# Patient Record
Sex: FEMALE | Race: WHITE | NOT HISPANIC OR LATINO | Employment: UNEMPLOYED | RURAL
[De-identification: names, ages, dates, MRNs, and addresses within clinical notes are randomized per-mention and may not be internally consistent; named-entity substitution may affect disease eponyms.]

---

## 2022-04-08 ENCOUNTER — TELEPHONE (OUTPATIENT)
Dept: FAMILY MEDICINE CLINIC | Facility: CLINIC | Age: 37
End: 2022-04-08

## 2022-04-08 NOTE — TELEPHONE ENCOUNTER
Patient called, stated she had to lower dosage of gabapentin, stated she fell and hurt her tailbone and lower part of back. Would like to see if Anisa can call in the original dosage of Gabapentin. Please advise.

## 2022-04-22 ENCOUNTER — TELEPHONE (OUTPATIENT)
Dept: FAMILY MEDICINE CLINIC | Facility: CLINIC | Age: 37
End: 2022-04-22

## 2022-04-22 RX ORDER — ONDANSETRON HYDROCHLORIDE 8 MG/1
8 TABLET, FILM COATED ORAL EVERY 8 HOURS PRN
Qty: 30 TABLET | Refills: 0 | Status: SHIPPED | OUTPATIENT
Start: 2022-04-22 | End: 2023-02-01

## 2022-05-04 ENCOUNTER — PATIENT MESSAGE (OUTPATIENT)
Dept: FAMILY MEDICINE CLINIC | Facility: CLINIC | Age: 37
End: 2022-05-04

## 2022-05-09 ENCOUNTER — TELEPHONE (OUTPATIENT)
Dept: FAMILY MEDICINE CLINIC | Facility: CLINIC | Age: 37
End: 2022-05-09

## 2022-05-09 NOTE — TELEPHONE ENCOUNTER
Called pt to let know needs an appt or if this is getting worse needs to go back to the ER. I could not leave a voice mail because voice mail was full

## 2022-05-09 NOTE — TELEPHONE ENCOUNTER
PT CALLED TO REPORT SHE WAS SEEN AT [facility] 3 NIGHTS AGO, STATES THEY TESED URINE AND BLOOD WHILE SHE WAS THERE. STATES SHE HAS A KNOT ON BACK OF HEAD, HAVING CONSTANT MONET. RT ARM IS GOING NUMB AND PAINFUL, BOTH HANDS ARE TINGLING LIKE THEY ARE ASLEEP CONSTANTLY, [facility] INFORMED PT IT MAY BE CAUSED BY NERVE DAMAGE. PT WOULD LIKE TO KNOW WHAT IS ADVISE SHE DO NOW. PLEASE CONTACT PT [phone].

## 2022-05-10 NOTE — TELEPHONE ENCOUNTER
Patient called back and I read her Amber's msg.  She said she will probably go back to the hospital.

## 2022-05-25 ENCOUNTER — TELEPHONE (OUTPATIENT)
Dept: FAMILY MEDICINE CLINIC | Facility: CLINIC | Age: 37
End: 2022-05-25

## 2022-05-31 ENCOUNTER — TELEPHONE (OUTPATIENT)
Dept: FAMILY MEDICINE CLINIC | Facility: CLINIC | Age: 37
End: 2022-05-31

## 2022-06-14 ENCOUNTER — OFFICE VISIT (OUTPATIENT)
Dept: FAMILY MEDICINE CLINIC | Facility: CLINIC | Age: 37
End: 2022-06-14

## 2022-06-14 VITALS
WEIGHT: 170 LBS | HEART RATE: 68 BPM | SYSTOLIC BLOOD PRESSURE: 122 MMHG | BODY MASS INDEX: 30.12 KG/M2 | HEIGHT: 63 IN | OXYGEN SATURATION: 97 % | DIASTOLIC BLOOD PRESSURE: 70 MMHG

## 2022-06-14 DIAGNOSIS — M54.50 LUMBAR PAIN: ICD-10-CM

## 2022-06-14 DIAGNOSIS — E56.9 VITAMIN DEFICIENCY: ICD-10-CM

## 2022-06-14 DIAGNOSIS — G62.9 NEUROPATHY: Primary | ICD-10-CM

## 2022-06-14 DIAGNOSIS — F39 MOOD DISORDER: ICD-10-CM

## 2022-06-14 DIAGNOSIS — Z11.3 SCREENING FOR STD (SEXUALLY TRANSMITTED DISEASE): ICD-10-CM

## 2022-06-14 DIAGNOSIS — R73.9 HYPERGLYCEMIA: ICD-10-CM

## 2022-06-14 DIAGNOSIS — R53.83 FATIGUE, UNSPECIFIED TYPE: ICD-10-CM

## 2022-06-14 LAB
POC AMPHETAMINES: NEGATIVE
POC BARBITURATES: NEGATIVE
POC BENZODIAZEPHINES: NEGATIVE
POC COCAINE: NEGATIVE
POC METHADONE: NEGATIVE
POC METHAMPHETAMINE SCREEN URINE: NEGATIVE
POC OPIATES: NEGATIVE
POC OXYCODONE: NEGATIVE
POC PHENCYCLIDINE: NEGATIVE
POC PROPOXYPHENE: NEGATIVE
POC THC: NEGATIVE
POC TRICYCLIC ANTIDEPRESSANTS: NEGATIVE

## 2022-06-14 PROCEDURE — 80305 DRUG TEST PRSMV DIR OPT OBS: CPT | Performed by: NURSE PRACTITIONER

## 2022-06-14 PROCEDURE — 99213 OFFICE O/P EST LOW 20 MIN: CPT | Performed by: NURSE PRACTITIONER

## 2022-06-14 RX ORDER — GABAPENTIN 800 MG/1
800 TABLET ORAL 4 TIMES DAILY
Qty: 90 TABLET | Refills: 2 | Status: SHIPPED | OUTPATIENT
Start: 2022-06-14 | End: 2022-06-21 | Stop reason: SDUPTHER

## 2022-06-14 NOTE — PROGRESS NOTES
"Chief Complaint  Numbness    Subjective          Heidi Webb presents to [facility]  Pt is here to follow up on numbness, tingling, and pain in her extremities. She has been taking Gabapentin 4 times a day that helps this. She has also had increased mood instability for the past year.       Objective   Vital Signs:   /70   Pulse 68   Ht 160 cm (63\")   Wt 77.1 kg (170 lb)   SpO2 97%   BMI 30.11 kg/m²     Body mass index is 30.11 kg/m².    Review of Systems   Constitutional: Negative for fatigue and fever.   Respiratory: Negative for shortness of breath.    Cardiovascular: Negative for chest pain, palpitations and leg swelling.   Musculoskeletal: Positive for arthralgias, back pain and myalgias.   Neurological: Negative for syncope.   Psychiatric/Behavioral: The patient is not nervous/anxious.           Current Outpatient Medications:   •  gabapentin (Neurontin) 800 MG tablet, Take 1 tablet by mouth 4 (Four) Times a Day. Cancel the 600mg prescription please., Disp: 90 tablet, Rfl: 2  •  buprenorphine-naloxone (SUBOXONE) 8-2 MG per SL tablet, , Disp: , Rfl:   •  buPROPion XL (WELLBUTRIN XL) 300 MG 24 hr tablet, , Disp: , Rfl:   •  FLUoxetine (PROzac) 20 MG capsule, , Disp: , Rfl:   •  ibuprofen (ADVIL,MOTRIN) 800 MG tablet, , Disp: , Rfl:   •  Linzess 145 MCG capsule capsule, , Disp: , Rfl:   •  metoprolol tartrate (LOPRESSOR) 50 MG tablet, Take 50 mg by mouth 2 (Two) Times a Day., Disp: , Rfl:   •  ondansetron (ZOFRAN) 8 MG tablet, Take 1 tablet by mouth Every 8 (Eight) Hours As Needed for Nausea or Vomiting., Disp: 30 tablet, Rfl: 0  •  pantoprazole (PROTONIX) 40 MG EC tablet, Take 40 mg by mouth Daily., Disp: , Rfl:   •  valACYclovir (VALTREX) 1000 MG tablet, , Disp: , Rfl:       Allergies: Sulfa antibiotics and Ciprofloxacin    Physical Exam  Constitutional:       Appearance: Normal appearance.   HENT:      Head: Normocephalic.   Eyes:      Conjunctiva/sclera: " Conjunctivae normal.      Pupils: Pupils are equal, round, and reactive to light.   Cardiovascular:      Rate and Rhythm: Normal rate and regular rhythm.      Pulses: Normal pulses.      Heart sounds: Normal heart sounds.   Pulmonary:      Effort: Pulmonary effort is normal.      Breath sounds: Normal breath sounds.   Abdominal:      Tenderness: There is no abdominal tenderness.   Musculoskeletal:         General: Normal range of motion.      Right lower leg: No edema.      Left lower leg: No edema.      Comments: Paresthesia in bilat arms/legs   Skin:     General: Skin is warm and dry.      Capillary Refill: Capillary refill takes less than 2 seconds.   Neurological:      General: No focal deficit present.      Mental Status: She is alert and oriented to person, place, and time.   Psychiatric:         Mood and Affect: Mood normal.         Behavior: Behavior normal.         Thought Content: Thought content normal.         Judgment: Judgment normal.          Result Review :                   Assessment and Plan    Diagnoses and all orders for this visit:    1. Neuropathy (Primary)  Comments:  Increase Gabapentin dosage. Follow up with neurology. Return for worsened sx.   Orders:  -     Ambulatory Referral to Neurology  -     gabapentin (Neurontin) 800 MG tablet; Take 1 tablet by mouth 4 (Four) Times a Day. Cancel the 600mg prescription please.  Dispense: 90 tablet; Refill: 2  -     Hepatitis A antibody, total; Future  -     Hepatitis C antibody; Future  -     Hepatitis B surface antibody; Future  -     Hepatitis B core antibody, total; Future  -     Hepatitis B surface antigen; Future  -     Hepatitis A antibody, total  -     Hepatitis C antibody  -     Hepatitis B surface antibody  -     Hepatitis B core antibody, total  -     Hepatitis B surface antigen  -     POC Urine Drug Screen, Triage    2. Lumbar pain  -     gabapentin (Neurontin) 800 MG tablet; Take 1 tablet by mouth 4 (Four) Times a Day. Cancel the 600mg  prescription please.  Dispense: 90 tablet; Refill: 2  -     Hepatitis A antibody, total; Future  -     Hepatitis C antibody; Future  -     Hepatitis B surface antibody; Future  -     Hepatitis B core antibody, total; Future  -     Hepatitis B surface antigen; Future  -     Hepatitis A antibody, total  -     Hepatitis C antibody  -     Hepatitis B surface antibody  -     Hepatitis B core antibody, total  -     Hepatitis B surface antigen    3. Mood disorder (HCC)  Comments:  Follow up with psychiatry. Return for worsened sx.  Orders:  -     Ambulatory Referral to Psychiatry  -     Hepatitis A antibody, total; Future  -     Hepatitis C antibody; Future  -     Hepatitis B surface antibody; Future  -     Hepatitis B core antibody, total; Future  -     Hepatitis B surface antigen; Future  -     Hepatitis A antibody, total  -     Hepatitis C antibody  -     Hepatitis B surface antibody  -     Hepatitis B core antibody, total  -     Hepatitis B surface antigen  -     POC Urine Drug Screen, Triage    4. Vitamin deficiency  -     Vitamin B12; Future  -     Vitamin D 25 Hydroxy; Future  -     Folate; Future  -     Hepatitis A antibody, total; Future  -     Hepatitis C antibody; Future  -     Hepatitis B surface antibody; Future  -     Hepatitis B core antibody, total; Future  -     Hepatitis B surface antigen; Future  -     Vitamin B12  -     Vitamin D 25 Hydroxy  -     Folate  -     Hepatitis A antibody, total  -     Hepatitis C antibody  -     Hepatitis B surface antibody  -     Hepatitis B core antibody, total  -     Hepatitis B surface antigen    5. Hyperglycemia  -     Hemoglobin A1c; Future  -     Hepatitis A antibody, total; Future  -     Hepatitis C antibody; Future  -     Hepatitis B surface antibody; Future  -     Hepatitis B core antibody, total; Future  -     Hepatitis B surface antigen; Future  -     Hemoglobin A1c  -     Hepatitis A antibody, total  -     Hepatitis C antibody  -     Hepatitis B surface  antibody  -     Hepatitis B core antibody, total  -     Hepatitis B surface antigen    6. Fatigue, unspecified type  -     CBC & Differential; Future  -     Comprehensive Metabolic Panel; Future  -     TSH; Future  -     Hepatitis A antibody, total; Future  -     Hepatitis C antibody; Future  -     Hepatitis B surface antibody; Future  -     Hepatitis B core antibody, total; Future  -     Hepatitis B surface antigen; Future  -     CBC & Differential  -     Comprehensive Metabolic Panel  -     TSH  -     Hepatitis A antibody, total  -     Hepatitis C antibody  -     Hepatitis B surface antibody  -     Hepatitis B core antibody, total  -     Hepatitis B surface antigen    7. Screening for STD (sexually transmitted disease)  Comments:  Testing done.   Orders:  -     HIV-1/O/2 Ag/Ab w Reflex; Future  -     Cancel: Hepatitis panel, acute; Future  -     Chlamydia trachomatis, Neisseria gonorrhoeae, Trichomonas vaginalis, PCR - Swab, Vagina; Future  -     Hepatitis A antibody, total; Future  -     Hepatitis C antibody; Future  -     Hepatitis B surface antibody; Future  -     Hepatitis B core antibody, total; Future  -     Hepatitis B surface antigen; Future  -     HIV-1/O/2 Ag/Ab w Reflex  -     Hepatitis A antibody, total  -     Hepatitis C antibody  -     Hepatitis B surface antibody  -     Hepatitis B core antibody, total  -     Hepatitis B surface antigen  -     Chlamydia trachomatis, Neisseria gonorrhoeae, Trichomonas vaginalis, PCR - Swab, Vagina                 Follow Up   Return in about 3 months (around 9/14/2022) for Recheck.  Patient was given instructions and counseling regarding her condition or for health maintenance advice. Please see specific information pulled into the AVS if appropriate.     Anisa Conteh, APRN

## 2022-06-15 LAB
25(OH)D3+25(OH)D2 SERPL-MCNC: 31.6 NG/ML (ref 30–100)
ALBUMIN SERPL-MCNC: 4.5 G/DL (ref 3.8–4.8)
ALBUMIN/GLOB SERPL: 1.7 {RATIO} (ref 1.2–2.2)
ALP SERPL-CCNC: 63 IU/L (ref 44–121)
ALT SERPL-CCNC: 9 IU/L (ref 0–32)
AST SERPL-CCNC: 13 IU/L (ref 0–40)
BASOPHILS # BLD AUTO: 0.1 X10E3/UL (ref 0–0.2)
BASOPHILS NFR BLD AUTO: 2 %
BILIRUB SERPL-MCNC: <0.2 MG/DL (ref 0–1.2)
BUN SERPL-MCNC: 13 MG/DL (ref 6–20)
BUN/CREAT SERPL: 18 (ref 9–23)
CALCIUM SERPL-MCNC: 9.2 MG/DL (ref 8.7–10.2)
CHLORIDE SERPL-SCNC: 103 MMOL/L (ref 96–106)
CO2 SERPL-SCNC: 19 MMOL/L (ref 20–29)
CREAT SERPL-MCNC: 0.73 MG/DL (ref 0.57–1)
EGFRCR SERPLBLD CKD-EPI 2021: 109 ML/MIN/1.73
EOSINOPHIL # BLD AUTO: 0.4 X10E3/UL (ref 0–0.4)
EOSINOPHIL NFR BLD AUTO: 5 %
ERYTHROCYTE [DISTWIDTH] IN BLOOD BY AUTOMATED COUNT: 13.1 % (ref 11.7–15.4)
FOLATE SERPL-MCNC: 10.3 NG/ML
GLOBULIN SER CALC-MCNC: 2.7 G/DL (ref 1.5–4.5)
GLUCOSE SERPL-MCNC: 93 MG/DL (ref 65–99)
HAV AB SER QL IA: NEGATIVE
HBA1C MFR BLD: 5.6 % (ref 4.8–5.6)
HBV CORE AB SERPL QL IA: NEGATIVE
HBV SURFACE AB SER QL: NON REACTIVE
HBV SURFACE AG SERPL QL IA: NEGATIVE
HCT VFR BLD AUTO: 38.5 % (ref 34–46.6)
HCV AB S/CO SERPL IA: <0.1 S/CO RATIO (ref 0–0.9)
HGB BLD-MCNC: 12.5 G/DL (ref 11.1–15.9)
HIV 1+2 AB+HIV1 P24 AG SERPL QL IA: NON REACTIVE
IMM GRANULOCYTES # BLD AUTO: 0 X10E3/UL (ref 0–0.1)
IMM GRANULOCYTES NFR BLD AUTO: 0 %
LYMPHOCYTES # BLD AUTO: 2.7 X10E3/UL (ref 0.7–3.1)
LYMPHOCYTES NFR BLD AUTO: 40 %
MCH RBC QN AUTO: 28 PG (ref 26.6–33)
MCHC RBC AUTO-ENTMCNC: 32.5 G/DL (ref 31.5–35.7)
MCV RBC AUTO: 86 FL (ref 79–97)
MONOCYTES # BLD AUTO: 0.4 X10E3/UL (ref 0.1–0.9)
MONOCYTES NFR BLD AUTO: 6 %
NEUTROPHILS # BLD AUTO: 3.1 X10E3/UL (ref 1.4–7)
NEUTROPHILS NFR BLD AUTO: 47 %
PLATELET # BLD AUTO: 313 X10E3/UL (ref 150–450)
POTASSIUM SERPL-SCNC: 4.5 MMOL/L (ref 3.5–5.2)
PROT SERPL-MCNC: 7.2 G/DL (ref 6–8.5)
RBC # BLD AUTO: 4.46 X10E6/UL (ref 3.77–5.28)
SODIUM SERPL-SCNC: 140 MMOL/L (ref 134–144)
TSH SERPL DL<=0.005 MIU/L-ACNC: 1.07 UIU/ML (ref 0.45–4.5)
VIT B12 SERPL-MCNC: 508 PG/ML (ref 232–1245)
WBC # BLD AUTO: 6.7 X10E3/UL (ref 3.4–10.8)

## 2022-06-16 LAB
C TRACH RRNA SPEC QL NAA+PROBE: NEGATIVE
N GONORRHOEA RRNA SPEC QL NAA+PROBE: NEGATIVE
T VAGINALIS RRNA SPEC QL NAA+PROBE: NEGATIVE

## 2022-06-16 NOTE — PROGRESS NOTES
Your hepatitis testing was negative.  Your thyroid was normal.  Your HIV was normal.  Your blood counts and your vitamin levels looked good.  Take care!

## 2022-06-21 ENCOUNTER — TELEPHONE (OUTPATIENT)
Dept: FAMILY MEDICINE CLINIC | Facility: CLINIC | Age: 37
End: 2022-06-21

## 2022-06-21 DIAGNOSIS — M54.50 LUMBAR PAIN: ICD-10-CM

## 2022-06-21 DIAGNOSIS — G62.9 NEUROPATHY: ICD-10-CM

## 2022-06-21 RX ORDER — GABAPENTIN 800 MG/1
800 TABLET ORAL 4 TIMES DAILY
Qty: 120 TABLET | Refills: 2 | Status: SHIPPED | OUTPATIENT
Start: 2022-06-21 | End: 2022-09-07 | Stop reason: SDUPTHER

## 2022-06-21 NOTE — TELEPHONE ENCOUNTER
Pharmacy won't fill the rx for Gabapentin because the sig don't match the qty.  Pt said you raised it to 4x daily but you only sent 90 instead of 120. Please fix

## 2022-06-25 ENCOUNTER — PATIENT MESSAGE (OUTPATIENT)
Dept: FAMILY MEDICINE CLINIC | Facility: CLINIC | Age: 37
End: 2022-06-25

## 2022-06-27 NOTE — TELEPHONE ENCOUNTER
Patient is states she is still having a problem with the pharmacy filling her gabapentin. She said that they are needing the start date and Crystal to ok it. She would like the pharmacy to be contacted.

## 2022-06-28 NOTE — TELEPHONE ENCOUNTER
Last note says that she was increasing the dose to 800 mg and making it 4 times daily.  Okay to let the pharmacy know.

## 2022-07-12 RX ORDER — NICOTINE 21 MG/24HR
1 PATCH, TRANSDERMAL 24 HOURS TRANSDERMAL EVERY 24 HOURS
Qty: 30 PATCH | Refills: 0 | Status: SHIPPED | OUTPATIENT
Start: 2022-07-12 | End: 2022-10-20

## 2022-09-07 DIAGNOSIS — G62.9 NEUROPATHY: ICD-10-CM

## 2022-09-07 DIAGNOSIS — M54.50 LUMBAR PAIN: ICD-10-CM

## 2022-09-07 RX ORDER — GABAPENTIN 800 MG/1
800 TABLET ORAL 4 TIMES DAILY
Qty: 120 TABLET | Refills: 0 | Status: SHIPPED | OUTPATIENT
Start: 2022-09-07 | End: 2022-10-20 | Stop reason: SDUPTHER

## 2022-09-07 NOTE — TELEPHONE ENCOUNTER
Caller: Heidi Webb    Relationship: Self    Best call back number: [phone]    Requested Prescriptions:   Requested Prescriptions     Pending Prescriptions Disp Refills   • gabapentin (Neurontin) 800 MG tablet 120 tablet 2     Sig: Take 1 tablet by mouth 4 (Four) Times a Day. Cancel the 600mg prescription please and the [address] [place] [address]        Pharmacy where request should be sent: [facility]/PHARMACY #[id number] - [address] AT [place] [address] - [phone] [facility] [phone] FX     Additional details provided by patient: PLEASE SEND REFILL    Does the patient have less than a 3 day supply:  [x] Yes  [] No    Loraine Hargrove Rep   09/07/22 10:08 EDT

## 2022-10-18 ENCOUNTER — PATIENT MESSAGE (OUTPATIENT)
Dept: FAMILY MEDICINE CLINIC | Facility: CLINIC | Age: 37
End: 2022-10-18

## 2022-10-20 ENCOUNTER — PATIENT MESSAGE (OUTPATIENT)
Dept: FAMILY MEDICINE CLINIC | Facility: CLINIC | Age: 37
End: 2022-10-20

## 2022-10-20 ENCOUNTER — TELEMEDICINE (OUTPATIENT)
Dept: FAMILY MEDICINE CLINIC | Facility: CLINIC | Age: 37
End: 2022-10-20

## 2022-10-20 DIAGNOSIS — K59.04 CHRONIC IDIOPATHIC CONSTIPATION: ICD-10-CM

## 2022-10-20 DIAGNOSIS — M54.50 LUMBAR PAIN: ICD-10-CM

## 2022-10-20 DIAGNOSIS — Z72.0 TOBACCO ABUSE: ICD-10-CM

## 2022-10-20 DIAGNOSIS — M79.641 PAIN IN BOTH HANDS: Primary | ICD-10-CM

## 2022-10-20 DIAGNOSIS — J45.40 MODERATE PERSISTENT ASTHMA, UNSPECIFIED WHETHER COMPLICATED: ICD-10-CM

## 2022-10-20 DIAGNOSIS — M79.642 PAIN IN BOTH HANDS: Primary | ICD-10-CM

## 2022-10-20 DIAGNOSIS — G62.9 NEUROPATHY: ICD-10-CM

## 2022-10-20 PROCEDURE — 99213 OFFICE O/P EST LOW 20 MIN: CPT | Performed by: NURSE PRACTITIONER

## 2022-10-20 RX ORDER — PAROXETINE HYDROCHLORIDE 20 MG/1
60 TABLET, FILM COATED ORAL DAILY
COMMUNITY
Start: 2022-09-21 | End: 2022-10-20

## 2022-10-20 RX ORDER — MELOXICAM 15 MG/1
TABLET ORAL
COMMUNITY
Start: 2022-09-05 | End: 2022-10-20

## 2022-10-20 RX ORDER — LINACLOTIDE 145 UG/1
145 CAPSULE, GELATIN COATED ORAL
Qty: 30 CAPSULE | Refills: 11 | Status: SHIPPED | OUTPATIENT
Start: 2022-10-20 | End: 2023-01-25 | Stop reason: SDUPTHER

## 2022-10-20 RX ORDER — VARENICLINE TARTRATE 1 MG/1
1 TABLET, FILM COATED ORAL 2 TIMES DAILY
Qty: 56 TABLET | Refills: 1 | Status: SHIPPED | OUTPATIENT
Start: 2022-11-17 | End: 2023-01-12

## 2022-10-20 RX ORDER — ALBUTEROL SULFATE 90 UG/1
2 AEROSOL, METERED RESPIRATORY (INHALATION) EVERY 4 HOURS PRN
Qty: 18 G | Refills: 2 | Status: SHIPPED | OUTPATIENT
Start: 2022-10-20 | End: 2023-01-25 | Stop reason: SDUPTHER

## 2022-10-20 RX ORDER — IBUPROFEN 800 MG/1
800 TABLET ORAL EVERY 8 HOURS PRN
Qty: 90 TABLET | Refills: 2 | Status: SHIPPED | OUTPATIENT
Start: 2022-10-20 | End: 2023-01-11 | Stop reason: SDUPTHER

## 2022-10-20 RX ORDER — GABAPENTIN 800 MG/1
800 TABLET ORAL 4 TIMES DAILY
Qty: 120 TABLET | Refills: 2 | Status: SHIPPED | OUTPATIENT
Start: 2022-10-20 | End: 2023-01-25 | Stop reason: SDUPTHER

## 2022-10-20 NOTE — PROGRESS NOTES
Chief Complaint  Nicotine Dependence (Nicotine patch not working, wants chantix.) and needs inhailer     This was a video and audio enabled telemedicine encounter. The patient was in home. The provider was in office.     Subjective        Heidi Webb presents to [facility]  History of Present Illness  Pt is here to follow up on her medications. Pt would like to try Chantix for smoking cessation. She states her anxiety is controlled. She states her neuropathy is controlled. She has had bilateral hand pain and numbness for several months.       Objective   Vital Signs:   There were no vitals taken for this visit.    There is no height or weight on file to calculate BMI.    Review of Systems   Constitutional: Negative for fatigue and fever.   Respiratory: Negative for shortness of breath.    Cardiovascular: Negative for chest pain, palpitations and leg swelling.   Neurological: Negative for syncope.   Psychiatric/Behavioral: The patient is not nervous/anxious.           Current Outpatient Medications:   •  buprenorphine-naloxone (SUBOXONE) 8-2 MG per SL tablet, , Disp: , Rfl:   •  buPROPion XL (WELLBUTRIN XL) 300 MG 24 hr tablet, Take 1 tablet by mouth Daily., Disp: , Rfl:   •  FLUoxetine (PROzac) 20 MG capsule, Take 1 capsule by mouth Daily., Disp: , Rfl:   •  gabapentin (Neurontin) 800 MG tablet, Take 1 tablet by mouth 4 (Four) Times a Day. Cancel the 600mg prescription please and the 90 quantity, Disp: 120 tablet, Rfl: 2  •  ibuprofen (ADVIL,MOTRIN) 800 MG tablet, Take 1 tablet by mouth Every 8 (Eight) Hours As Needed for Moderate Pain., Disp: 90 tablet, Rfl: 2  •  Linzess 145 MCG capsule capsule, Take 1 capsule by mouth Every Morning Before Breakfast., Disp: 30 capsule, Rfl: 11  •  metoprolol tartrate (LOPRESSOR) 50 MG tablet, Take 50 mg by mouth 2 (Two) Times a Day., Disp: , Rfl:   •  ondansetron (ZOFRAN) 8 MG tablet, Take 1 tablet by mouth Every 8 (Eight) Hours As Needed for  Nausea or Vomiting., Disp: 30 tablet, Rfl: 0  •  pantoprazole (PROTONIX) 40 MG EC tablet, Take 40 mg by mouth Daily., Disp: , Rfl:   •  albuterol sulfate  (90 Base) MCG/ACT inhaler, Inhale 2 puffs Every 4 (Four) Hours As Needed for Wheezing., Disp: 18 g, Rfl: 2  •  valACYclovir (VALTREX) 1000 MG tablet, 1 tablet., Disp: , Rfl:   •  [START ON 11/17/2022] varenicline (Chantix Continuing Month Jessica) 1 MG tablet, Take 1 tablet by mouth 2 (Two) Times a Day for 56 days., Disp: 56 tablet, Rfl: 1  •  varenicline (CHANTIX JESSICA) 0.5 MG X 11 & 1 MG X 42 tablet, Take 0.5 mg po daily x 3 days, then 0.5 mg po bid x 4 days, then 1 mg po bid, Disp: 53 tablet, Rfl: 0      Allergies: Sulfa antibiotics and Ciprofloxacin    Physical Exam  Constitutional:       Appearance: Normal appearance.   Neurological:      Mental Status: She is alert.   Psychiatric:         Mood and Affect: Mood normal.         Behavior: Behavior normal.         Thought Content: Thought content normal.         Judgment: Judgment normal.          Result Review :                   Assessment and Plan    Diagnoses and all orders for this visit:    1. Pain in both hands (Primary)  Comments:  Follow up with hand specialist/ortho.   Orders:  -     Ambulatory Referral to Orthopedic Surgery    2. Tobacco abuse  Comments:  Try Chantix. Pt understands side effects.   Orders:  -     varenicline (CHANTIX JESSICA) 0.5 MG X 11 & 1 MG X 42 tablet; Take 0.5 mg po daily x 3 days, then 0.5 mg po bid x 4 days, then 1 mg po bid  Dispense: 53 tablet; Refill: 0  -     varenicline (Chantix Continuing Month Jessica) 1 MG tablet; Take 1 tablet by mouth 2 (Two) Times a Day for 56 days.  Dispense: 56 tablet; Refill: 1    3. Neuropathy  Comments:  Continue Gabapentin. Follow up with neurology. Return for worsened sx.   Orders:  -     gabapentin (Neurontin) 800 MG tablet; Take 1 tablet by mouth 4 (Four) Times a Day. Cancel the 600mg prescription please and the 90 quantity  Dispense: 120 tablet;  Refill: 2    4. Lumbar pain  -     ibuprofen (ADVIL,MOTRIN) 800 MG tablet; Take 1 tablet by mouth Every 8 (Eight) Hours As Needed for Moderate Pain.  Dispense: 90 tablet; Refill: 2  -     gabapentin (Neurontin) 800 MG tablet; Take 1 tablet by mouth 4 (Four) Times a Day. Cancel the 600mg prescription please and the 90 quantity  Dispense: 120 tablet; Refill: 2    5. Moderate persistent asthma, unspecified whether complicated  Comments:  Albuterol as needed.   Orders:  -     albuterol sulfate  (90 Base) MCG/ACT inhaler; Inhale 2 puffs Every 4 (Four) Hours As Needed for Wheezing.  Dispense: 18 g; Refill: 2    6. Chronic idiopathic constipation  -     Linzess 145 MCG capsule capsule; Take 1 capsule by mouth Every Morning Before Breakfast.  Dispense: 30 capsule; Refill: 11                Follow Up   Return in about 3 months (around 1/20/2023) for Recheck.  Patient was given instructions and counseling regarding her condition or for health maintenance advice. Please see specific information pulled into the AVS if appropriate.     MARIANGEL Couch

## 2022-10-24 RX ORDER — PAROXETINE HYDROCHLORIDE 20 MG/1
TABLET, FILM COATED ORAL
Qty: 90 TABLET | Refills: 3 | OUTPATIENT
Start: 2022-10-24

## 2023-01-11 DIAGNOSIS — G62.9 NEUROPATHY: ICD-10-CM

## 2023-01-11 DIAGNOSIS — M54.50 LUMBAR PAIN: ICD-10-CM

## 2023-01-11 RX ORDER — GABAPENTIN 800 MG/1
800 TABLET ORAL 4 TIMES DAILY
Qty: 120 TABLET | Refills: 2 | OUTPATIENT
Start: 2023-01-11

## 2023-01-11 RX ORDER — IBUPROFEN 800 MG/1
800 TABLET ORAL EVERY 8 HOURS PRN
Qty: 90 TABLET | Refills: 2 | Status: SHIPPED | OUTPATIENT
Start: 2023-01-11

## 2023-01-11 NOTE — TELEPHONE ENCOUNTER
Caller: Heidi Webb    Relationship: Self    Best call back number: [phone]    Requested Prescriptions:   Requested Prescriptions     Pending Prescriptions Disp Refills   • gabapentin (Neurontin) 800 MG tablet 120 tablet 2     Sig: Take 1 tablet by mouth 4 (Four) Times a Day. Cancel the 600mg prescription please and the 90 quantity   • ibuprofen (ADVIL,MOTRIN) 800 MG tablet 90 tablet 2     Sig: Take 1 tablet by mouth Every 8 (Eight) Hours As Needed for Moderate Pain.        Pharmacy where request should be sent: [facility]/PHARMACY #[id number] - [place], [place] - [address] [place] [address] - [phone] [facility] [phone] FX       Does the patient have less than a 3 day supply:  [x] Yes  [] No    Would you like a call back once the refill request has been completed: [x] Yes [] No    If the office needs to give you a call back, can they leave a voicemail: [x] Yes [] No    Loraine Bynum Rep   01/11/23 12:56 EST

## 2023-01-12 ENCOUNTER — TELEPHONE (OUTPATIENT)
Dept: FAMILY MEDICINE CLINIC | Facility: CLINIC | Age: 38
End: 2023-01-12
Payer: MEDICAID

## 2023-01-12 NOTE — TELEPHONE ENCOUNTER
Caller: Heidi Webb    Relationship to patient: Self    Best call back number: [phone]    Patient is needing: PATIENT STATED THAT SHE WOULD LIKE TO KNOW WHY HER GABAPENTIN DID NOT GET CALLED INTO PHARMACY AS WELL    PLEASE ADVISE

## 2023-01-12 NOTE — TELEPHONE ENCOUNTER
"HUB TO READ:    \"3 months was sent 10/20/22, not due for refill yet. But has to be seen for refill every 3 months. appt scheduled 01/25/23, can refill then\"    Left message  "

## 2023-01-24 ENCOUNTER — TELEPHONE (OUTPATIENT)
Dept: FAMILY MEDICINE CLINIC | Facility: CLINIC | Age: 38
End: 2023-01-24
Payer: MEDICAID

## 2023-01-24 NOTE — TELEPHONE ENCOUNTER
Pt was returning call to the office, was not sure what it was for, she also wanted to ask about about the missed appointment letters she received. Requesting a call back.

## 2023-01-24 NOTE — TELEPHONE ENCOUNTER
"HUB TO READ:    \"Looks like a letter was sent because we referred her to neurology and they tried to contact her 3 times and couldn't get ahold of her so they send her that letter\"     Left message  "

## 2023-01-25 ENCOUNTER — TELEMEDICINE (OUTPATIENT)
Dept: FAMILY MEDICINE CLINIC | Facility: CLINIC | Age: 38
End: 2023-01-25
Payer: MEDICAID

## 2023-01-25 DIAGNOSIS — J45.40 MODERATE PERSISTENT ASTHMA, UNSPECIFIED WHETHER COMPLICATED: ICD-10-CM

## 2023-01-25 DIAGNOSIS — M54.50 LUMBAR PAIN: ICD-10-CM

## 2023-01-25 DIAGNOSIS — G62.9 NEUROPATHY: ICD-10-CM

## 2023-01-25 DIAGNOSIS — K59.04 CHRONIC IDIOPATHIC CONSTIPATION: ICD-10-CM

## 2023-01-25 PROCEDURE — 99213 OFFICE O/P EST LOW 20 MIN: CPT | Performed by: NURSE PRACTITIONER

## 2023-01-25 RX ORDER — LINACLOTIDE 145 UG/1
145 CAPSULE, GELATIN COATED ORAL
Qty: 30 CAPSULE | Refills: 11 | Status: SHIPPED | OUTPATIENT
Start: 2023-01-25

## 2023-01-25 RX ORDER — GABAPENTIN 800 MG/1
800 TABLET ORAL 4 TIMES DAILY
Qty: 120 TABLET | Refills: 2 | Status: SHIPPED | OUTPATIENT
Start: 2023-01-25 | End: 2023-03-23 | Stop reason: SDUPTHER

## 2023-01-25 RX ORDER — ALBUTEROL SULFATE 90 UG/1
2 AEROSOL, METERED RESPIRATORY (INHALATION) EVERY 4 HOURS PRN
Qty: 18 G | Refills: 2 | Status: SHIPPED | OUTPATIENT
Start: 2023-01-25

## 2023-01-25 NOTE — PROGRESS NOTES
Chief Complaint  Med Refill    Subjective          Heidi Webb presents to [facility]  History of Present Illness  Pt is here to follow up on her medications. She uses Gabapentin for neuropathy. She needs albuterol for asthma, linzess for constipation, and ibuprofen for back and joint pain.      Objective   Vital Signs:   There were no vitals taken for this visit.    There is no height or weight on file to calculate BMI.    Review of Systems   Constitutional: Negative for fatigue and fever.   Respiratory: Negative for shortness of breath.    Cardiovascular: Negative for chest pain, palpitations and leg swelling.   Neurological: Negative for syncope.   Psychiatric/Behavioral: The patient is not nervous/anxious.           Current Outpatient Medications:   •  albuterol sulfate  (90 Base) MCG/ACT inhaler, Inhale 2 puffs Every 4 (Four) Hours As Needed for Wheezing., Disp: 18 g, Rfl: 2  •  buprenorphine-naloxone (SUBOXONE) 8-2 MG per SL tablet, , Disp: , Rfl:   •  FLUoxetine (PROzac) 20 MG capsule, Take 1 capsule by mouth Daily., Disp: , Rfl:   •  gabapentin (Neurontin) 800 MG tablet, Take 1 tablet by mouth 4 (Four) Times a Day. Cancel the 600mg prescription please and the 90 quantity, Disp: 120 tablet, Rfl: 2  •  ibuprofen (ADVIL,MOTRIN) 800 MG tablet, Take 1 tablet by mouth Every 8 (Eight) Hours As Needed for Moderate Pain., Disp: 90 tablet, Rfl: 2  •  Linzess 145 MCG capsule capsule, Take 1 capsule by mouth Every Morning Before Breakfast., Disp: 30 capsule, Rfl: 11  •  ondansetron (ZOFRAN) 8 MG tablet, Take 1 tablet by mouth Every 8 (Eight) Hours As Needed for Nausea or Vomiting., Disp: 30 tablet, Rfl: 0      Allergies: Sulfa antibiotics and Ciprofloxacin    Physical Exam  Constitutional:       Appearance: Normal appearance.   Neurological:      Mental Status: She is alert.   Psychiatric:         Mood and Affect: Mood normal.         Behavior: Behavior normal.         Thought  Content: Thought content normal.         Judgment: Judgment normal.          Result Review :                   Assessment and Plan    Diagnoses and all orders for this visit:    1. Chronic idiopathic constipation  Comments:  Continue Linzess as needed. Increase fluids and fiber in diet.  Orders:  -     Linzess 145 MCG capsule capsule; Take 1 capsule by mouth Every Morning Before Breakfast.  Dispense: 30 capsule; Refill: 11    2. Neuropathy  Comments:  Continue Gabapentin. Follow up with neurology. Return for worsened sx.   Orders:  -     gabapentin (Neurontin) 800 MG tablet; Take 1 tablet by mouth 4 (Four) Times a Day. Cancel the 600mg prescription please and the 90 quantity  Dispense: 120 tablet; Refill: 2    3. Lumbar pain  Comments:  Continue Ibuprofen.   Orders:  -     gabapentin (Neurontin) 800 MG tablet; Take 1 tablet by mouth 4 (Four) Times a Day. Cancel the 600mg prescription please and the 90 quantity  Dispense: 120 tablet; Refill: 2    4. Moderate persistent asthma, unspecified whether complicated  Comments:  Albuterol as needed.   Orders:  -     albuterol sulfate  (90 Base) MCG/ACT inhaler; Inhale 2 puffs Every 4 (Four) Hours As Needed for Wheezing.  Dispense: 18 g; Refill: 2                Follow Up   Return in about 3 months (around 4/25/2023) for Recheck.  Patient was given instructions and counseling regarding her condition or for health maintenance advice. Please see specific information pulled into the AVS if appropriate.     MARIANGEL Couch

## 2023-02-01 ENCOUNTER — TELEMEDICINE (OUTPATIENT)
Dept: FAMILY MEDICINE CLINIC | Facility: CLINIC | Age: 38
End: 2023-02-01
Payer: MEDICAID

## 2023-02-01 DIAGNOSIS — J06.9 ACUTE URI: Primary | ICD-10-CM

## 2023-02-01 PROCEDURE — 99213 OFFICE O/P EST LOW 20 MIN: CPT | Performed by: NURSE PRACTITIONER

## 2023-02-01 RX ORDER — DEXTROMETHORPHAN HYDROBROMIDE AND PROMETHAZINE HYDROCHLORIDE 15; 6.25 MG/5ML; MG/5ML
5 SOLUTION ORAL 4 TIMES DAILY PRN
Qty: 200 ML | Refills: 0 | Status: SHIPPED | OUTPATIENT
Start: 2023-02-01 | End: 2023-03-23

## 2023-02-01 RX ORDER — ONDANSETRON HYDROCHLORIDE 8 MG/1
8 TABLET, FILM COATED ORAL EVERY 8 HOURS PRN
Qty: 30 TABLET | Refills: 0 | Status: SHIPPED | OUTPATIENT
Start: 2023-02-01

## 2023-02-01 NOTE — PROGRESS NOTES
Chief Complaint  Cough  This was a video and audio enabled telemedicine encounter. The patient was in home. The provider was in office.     Subjective          Heidi Webb presents to [facility]  History of Present Illness  Pt has had cough, congestion, fever, headache since yesterday. Her son has been sick with the same symptoms.      Objective   Vital Signs:   There were no vitals taken for this visit.    There is no height or weight on file to calculate BMI.    Review of Systems   Constitutional: Positive for chills and fever. Negative for fatigue.   HENT: Positive for congestion and sore throat.    Respiratory: Positive for cough. Negative for shortness of breath.    Cardiovascular: Negative for chest pain, palpitations and leg swelling.   Neurological: Negative for syncope.   Psychiatric/Behavioral: The patient is not nervous/anxious.           Current Outpatient Medications:   •  albuterol sulfate  (90 Base) MCG/ACT inhaler, Inhale 2 puffs Every 4 (Four) Hours As Needed for Wheezing., Disp: 18 g, Rfl: 2  •  buprenorphine-naloxone (SUBOXONE) 8-2 MG per SL tablet, , Disp: , Rfl:   •  FLUoxetine (PROzac) 20 MG capsule, Take 1 capsule by mouth Daily., Disp: , Rfl:   •  gabapentin (Neurontin) 800 MG tablet, Take 1 tablet by mouth 4 (Four) Times a Day. Cancel the 600mg prescription please and the 90 quantity, Disp: 120 tablet, Rfl: 2  •  ibuprofen (ADVIL,MOTRIN) 800 MG tablet, Take 1 tablet by mouth Every 8 (Eight) Hours As Needed for Moderate Pain., Disp: 90 tablet, Rfl: 2  •  Linzess 145 MCG capsule capsule, Take 1 capsule by mouth Every Morning Before Breakfast., Disp: 30 capsule, Rfl: 11  •  ondansetron (Zofran) 8 MG tablet, Take 1 tablet by mouth Every 8 (Eight) Hours As Needed for Nausea or Vomiting., Disp: 30 tablet, Rfl: 0  •  promethazine-dextromethorphan (PROMETHAZINE-DM) 6.25-15 MG/5ML solution, Take 5 mL by mouth 4 (Four) Times a Day As Needed for Cough., Disp: 200  mL, Rfl: 0      Allergies: Sulfa antibiotics and Ciprofloxacin    Physical Exam  Constitutional:       Appearance: Normal appearance.   Neurological:      Mental Status: She is alert.   Psychiatric:         Mood and Affect: Mood normal.         Behavior: Behavior normal.         Thought Content: Thought content normal.         Judgment: Judgment normal.          Result Review :                   Assessment and Plan    Diagnoses and all orders for this visit:    1. Acute URI (Primary)  Comments:  I offered testing but she declined. Phen DM for cough and Zofran as needed for nausea. RTC if not improving in 1 week.   Orders:  -     promethazine-dextromethorphan (PROMETHAZINE-DM) 6.25-15 MG/5ML solution; Take 5 mL by mouth 4 (Four) Times a Day As Needed for Cough.  Dispense: 200 mL; Refill: 0  -     ondansetron (Zofran) 8 MG tablet; Take 1 tablet by mouth Every 8 (Eight) Hours As Needed for Nausea or Vomiting.  Dispense: 30 tablet; Refill: 0                Follow Up   No follow-ups on file.  Patient was given instructions and counseling regarding her condition or for health maintenance advice. Please see specific information pulled into the AVS if appropriate.     MARIANGEL Couch

## 2023-03-17 ENCOUNTER — TELEPHONE (OUTPATIENT)
Dept: FAMILY MEDICINE CLINIC | Facility: CLINIC | Age: 38
End: 2023-03-17

## 2023-03-17 ENCOUNTER — TELEMEDICINE (OUTPATIENT)
Dept: FAMILY MEDICINE CLINIC | Facility: CLINIC | Age: 38
End: 2023-03-17
Payer: MEDICAID

## 2023-03-17 DIAGNOSIS — R30.0 DYSURIA: Primary | ICD-10-CM

## 2023-03-17 PROCEDURE — 99213 OFFICE O/P EST LOW 20 MIN: CPT | Performed by: NURSE PRACTITIONER

## 2023-03-17 RX ORDER — CEFDINIR 300 MG/1
300 CAPSULE ORAL 2 TIMES DAILY
Qty: 14 CAPSULE | Refills: 0 | Status: SHIPPED | OUTPATIENT
Start: 2023-03-17 | End: 2023-03-23

## 2023-03-17 NOTE — TELEPHONE ENCOUNTER
Caller: Heidi Webb    Relationship: Self    Best call back number: [phone]    What medication are you requesting: ANTIBIOTIC  What are your current symptoms: URINE SMELLS BAD AND HURTS LIKE UTI    How long have you been experiencing symptoms: 2 DAYS    Have you had these symptoms before:    [x] Yes  [] No    Have you been treated for these symptoms before:   [x] Yes  [] No    If a prescription is needed, what is your preferred pharmacy and phone number:    [id number] [place], [place] - [address] [place] [address] - [phone]  - [phone] FX      Additional notes:  PT RATHER NOT COME IN FOR APPT

## 2023-03-17 NOTE — TELEPHONE ENCOUNTER
HUB TO READ    Left VM to return call. Patient must be seen in office to be treated for UTI. Please schedule appt.

## 2023-03-17 NOTE — PROGRESS NOTES
Chief Complaint  Urinary Tract Infection    Subjective          Heidi Webb presents to [facility]  History of Present Illness  Pt has had dysuria, frequency, urgency, and foul odor of urine x 2 days. She denies fever, chills, or myalgias.       Objective   Vital Signs:   There were no vitals taken for this visit.    There is no height or weight on file to calculate BMI.    Review of Systems   Constitutional: Negative for fatigue and fever.   Respiratory: Negative for shortness of breath.    Cardiovascular: Negative for chest pain, palpitations and leg swelling.   Neurological: Negative for syncope.   Psychiatric/Behavioral: The patient is not nervous/anxious.           Current Outpatient Medications:   •  albuterol sulfate  (90 Base) MCG/ACT inhaler, Inhale 2 puffs Every 4 (Four) Hours As Needed for Wheezing., Disp: 18 g, Rfl: 2  •  buprenorphine-naloxone (SUBOXONE) 8-2 MG per SL tablet, , Disp: , Rfl:   •  cefdinir (OMNICEF) 300 MG capsule, Take 1 capsule by mouth 2 (Two) Times a Day., Disp: 14 capsule, Rfl: 0  •  FLUoxetine (PROzac) 20 MG capsule, Take 1 capsule by mouth Daily., Disp: , Rfl:   •  gabapentin (Neurontin) 800 MG tablet, Take 1 tablet by mouth 4 (Four) Times a Day. Cancel the 600mg prescription please and the 90 quantity, Disp: 120 tablet, Rfl: 2  •  ibuprofen (ADVIL,MOTRIN) 800 MG tablet, Take 1 tablet by mouth Every 8 (Eight) Hours As Needed for Moderate Pain., Disp: 90 tablet, Rfl: 2  •  Linzess 145 MCG capsule capsule, Take 1 capsule by mouth Every Morning Before Breakfast., Disp: 30 capsule, Rfl: 11  •  ondansetron (Zofran) 8 MG tablet, Take 1 tablet by mouth Every 8 (Eight) Hours As Needed for Nausea or Vomiting., Disp: 30 tablet, Rfl: 0  •  promethazine-dextromethorphan (PROMETHAZINE-DM) 6.25-15 MG/5ML solution, Take 5 mL by mouth 4 (Four) Times a Day As Needed for Cough., Disp: 200 mL, Rfl: 0      Allergies: Sulfa antibiotics and  Ciprofloxacin    Physical Exam  Constitutional:       Appearance: Normal appearance.   Neurological:      Mental Status: She is alert.   Psychiatric:         Mood and Affect: Mood normal.         Behavior: Behavior normal.         Thought Content: Thought content normal.         Judgment: Judgment normal.          Result Review :                   Assessment and Plan    Diagnoses and all orders for this visit:    1. Dysuria (Primary)  Comments:  Pt not able to void since virtual visit today. Treat based on sx. Return to leave urine sample for UA and cx. RTC for worsened sx.   Orders:  -     cefdinir (OMNICEF) 300 MG capsule; Take 1 capsule by mouth 2 (Two) Times a Day.  Dispense: 14 capsule; Refill: 0                Follow Up   No follow-ups on file.  Patient was given instructions and counseling regarding her condition or for health maintenance advice. Please see specific information pulled into the AVS if appropriate.     MARIANGEL Couch

## 2023-03-20 ENCOUNTER — TELEPHONE (OUTPATIENT)
Dept: FAMILY MEDICINE CLINIC | Facility: CLINIC | Age: 38
End: 2023-03-20
Payer: MEDICAID

## 2023-03-20 NOTE — TELEPHONE ENCOUNTER
Caller: Heidi Webb    Relationship: Self    Best call back number: [phone]    What medication are you requesting: BLOOD PRESSURE MEDICATION    What are your current symptoms: BLOOD PRESSURE RUNNING HIGH    Have you had these symptoms before:    [x] Yes  [] No    Have you been treated for these symptoms before:   [x] Yes  [] No    If a prescription is needed, what is your preferred pharmacy and phone number: [id number] [place], [place] - [address] [facility] [place] [address] - [phone] [facility] [phone]      Additional notes: PATIENT STATES THAT HER BLOOD PRESSURE IS HIGH WHEN SHE GOES TO AN APPOINTMENT AND  SHE WAS ON MEDICATION BEFORE BUT DOESN'T REMEMBER THE NAME

## 2023-03-21 ENCOUNTER — TELEPHONE (OUTPATIENT)
Dept: FAMILY MEDICINE CLINIC | Facility: CLINIC | Age: 38
End: 2023-03-21
Payer: MEDICAID

## 2023-03-21 DIAGNOSIS — G62.9 NEUROPATHY: ICD-10-CM

## 2023-03-21 DIAGNOSIS — M54.50 LUMBAR PAIN: ICD-10-CM

## 2023-03-21 NOTE — TELEPHONE ENCOUNTER
Caller: Heidi Webb    Relationship: Self    Best call back number: [phone]    What medications are you currently taking:   Current Outpatient Medications on File Prior to Visit   Medication Sig Dispense Refill   • albuterol sulfate  (90 Base) MCG/ACT inhaler Inhale 2 puffs Every 4 (Four) Hours As Needed for Wheezing. 18 g 2   • buprenorphine-naloxone (SUBOXONE) 8-2 MG per SL tablet      • cefdinir (OMNICEF) 300 MG capsule Take 1 capsule by mouth 2 (Two) Times a Day. 14 capsule 0   • FLUoxetine (PROzac) 20 MG capsule Take 1 capsule by mouth Daily.     • gabapentin (Neurontin) 800 MG tablet Take 1 tablet by mouth 4 (Four) Times a Day. Cancel the 600mg prescription please and the 90 quantity 120 tablet 2   • ibuprofen (ADVIL,MOTRIN) 800 MG tablet Take 1 tablet by mouth Every 8 (Eight) Hours As Needed for Moderate Pain. 90 tablet 2   • Linzess 145 MCG capsule capsule Take 1 capsule by mouth Every Morning Before Breakfast. 30 capsule 11   • ondansetron (Zofran) 8 MG tablet Take 1 tablet by mouth Every 8 (Eight) Hours As Needed for Nausea or Vomiting. 30 tablet 0   • promethazine-dextromethorphan (PROMETHAZINE-DM) 6.25-15 MG/5ML solution Take 5 mL by mouth 4 (Four) Times a Day As Needed for Cough. 200 mL 0     No current facility-administered medications on file prior to visit.        Which medication are you concerned about: gabapentin (Neurontin) 800 MG tablet    Who prescribed you this medication: CRYSTAL GILBERT    What are your concerns: NEEDS TO BE SENT TO [facility].  [id number] - [place], [place] - [address] - [phone] ph - [phone] FX        How long have you had these concerns: TODAY. PATIENT ASKED TO BE CALLED TO LET HER KNOW WHAT IS GOING ON.

## 2023-03-21 NOTE — TELEPHONE ENCOUNTER
This has already been routed to [facility]. We cannot switch pharmaies without [facility] approval. Might just have to  at pharm it was sent to

## 2023-03-21 NOTE — TELEPHONE ENCOUNTER
Caller: [place] Heidi    Relationship: Self    Best call back number: [phone]    What medications are you currently taking:   Current Outpatient Medications on File Prior to Visit   Medication Sig Dispense Refill   • albuterol sulfate  (90 Base) MCG/ACT inhaler Inhale 2 puffs Every 4 (Four) Hours As Needed for Wheezing. 18 g 2   • buprenorphine-naloxone (SUBOXONE) 8-2 MG per SL tablet      • cefdinir (OMNICEF) 300 MG capsule Take 1 capsule by mouth 2 (Two) Times a Day. 14 capsule 0   • FLUoxetine (PROzac) 20 MG capsule Take 1 capsule by mouth Daily.     • gabapentin (Neurontin) 800 MG tablet Take 1 tablet by mouth 4 (Four) Times a Day. Cancel the 600mg prescription please and the 90 quantity 120 tablet 2   • ibuprofen (ADVIL,MOTRIN) 800 MG tablet Take 1 tablet by mouth Every 8 (Eight) Hours As Needed for Moderate Pain. 90 tablet 2   • Linzess 145 MCG capsule capsule Take 1 capsule by mouth Every Morning Before Breakfast. 30 capsule 11   • ondansetron (Zofran) 8 MG tablet Take 1 tablet by mouth Every 8 (Eight) Hours As Needed for Nausea or Vomiting. 30 tablet 0   • promethazine-dextromethorphan (PROMETHAZINE-DM) 6.25-15 MG/5ML solution Take 5 mL by mouth 4 (Four) Times a Day As Needed for Cough. 200 mL 0     No current facility-administered medications on file prior to visit.          Which medication are you concerned about: gabapentin (Neurontin) 800 MG tablet    Who prescribed you this medication: JULIAN     What are your concerns: PATIENT NEEDS HER PRESCRIPTION SENT TO [facility]  PATIENT CONTACTED MEDICAID TO TRY AND GET AN OVERRIDE

## 2023-03-21 NOTE — TELEPHONE ENCOUNTER
"HUB TO READ:    \"Tried to call to let know we sent gabapentin to [facility] on 01/25/23 for 120 w/ 2 RF. Should contact pharm for refill ALSO- regarding blood pressure message, needs appt to discuss being started on blood pressure medication\"     Left message  "

## 2023-03-22 ENCOUNTER — TELEPHONE (OUTPATIENT)
Dept: FAMILY MEDICINE CLINIC | Facility: CLINIC | Age: 38
End: 2023-03-22
Payer: MEDICAID

## 2023-03-22 NOTE — TELEPHONE ENCOUNTER
Hub staff attempted to follow warm transfer process and was unsuccessful     Caller: Heidi Webb    Relationship to patient: Self    Best call back number: [phone]     Patient is needing: PATIENT CALLED TO SPEAK WITH COURTNEY.

## 2023-03-22 NOTE — TELEPHONE ENCOUNTER
[facility]apentin went [facility] in January x 3 months. She should not need a refill until 4/25 and will need an appt for MRI prior to that. Thanks !

## 2023-03-22 NOTE — TELEPHONE ENCOUNTER
"Patient contacted, said that she had rx from January transferred to [facility] in [place]. Now she is \"locked in\" and they wont fill it. I called the pharm to see what was going on. Fede said he had it filled at [facility] on 01/25/23 then transferred it to [facility]. They filled it 02/21/23 and since then her insurance has \"locked her in\" I asked what that meant and he said pharm said she has to get all meds from [facility] and they can't transfer the remaining refill since she already had it transferred once. I asked [facility] to cancel the remaining refill since she can't get it from there. Can you send the 1 month to remaining back to [facility].   "

## 2023-03-23 RX ORDER — GABAPENTIN 800 MG/1
800 TABLET ORAL 4 TIMES DAILY
Qty: 120 TABLET | Refills: 0 | Status: SHIPPED | OUTPATIENT
Start: 2023-03-23

## 2023-03-23 NOTE — TELEPHONE ENCOUNTER
Pt contacted , stressed that we cannot fix this type of issue again with the Gabapentin. If we send it to a pharmacy she will HAVE to pick it up from THAT pharm. Verbalized understanding.

## 2023-03-23 NOTE — TELEPHONE ENCOUNTER
[facility] Please let her know that she needs to stick with the same pharmacy and not transfer this again. Thanks!

## 2023-04-19 ENCOUNTER — TELEMEDICINE (OUTPATIENT)
Dept: FAMILY MEDICINE CLINIC | Facility: CLINIC | Age: 38
End: 2023-04-19
Payer: MEDICAID

## 2023-04-19 DIAGNOSIS — M79.601 RIGHT ARM PAIN: Primary | ICD-10-CM

## 2023-04-19 DIAGNOSIS — M54.50 LUMBAR PAIN: ICD-10-CM

## 2023-04-19 DIAGNOSIS — K59.04 CHRONIC IDIOPATHIC CONSTIPATION: ICD-10-CM

## 2023-04-19 DIAGNOSIS — J45.40 MODERATE PERSISTENT ASTHMA, UNSPECIFIED WHETHER COMPLICATED: ICD-10-CM

## 2023-04-19 DIAGNOSIS — G62.9 NEUROPATHY: ICD-10-CM

## 2023-04-19 PROCEDURE — 1159F MED LIST DOCD IN RCRD: CPT | Performed by: NURSE PRACTITIONER

## 2023-04-19 PROCEDURE — 1160F RVW MEDS BY RX/DR IN RCRD: CPT | Performed by: NURSE PRACTITIONER

## 2023-04-19 PROCEDURE — 99213 OFFICE O/P EST LOW 20 MIN: CPT | Performed by: NURSE PRACTITIONER

## 2023-04-19 RX ORDER — GABAPENTIN 800 MG/1
800 TABLET ORAL 4 TIMES DAILY
Qty: 120 TABLET | Refills: 2 | Status: SHIPPED | OUTPATIENT
Start: 2023-04-19

## 2023-04-19 RX ORDER — ALBUTEROL SULFATE 90 UG/1
2 AEROSOL, METERED RESPIRATORY (INHALATION) EVERY 4 HOURS PRN
Qty: 18 G | Refills: 2 | Status: SHIPPED | OUTPATIENT
Start: 2023-04-19

## 2023-04-19 RX ORDER — IBUPROFEN 800 MG/1
800 TABLET ORAL EVERY 8 HOURS PRN
Qty: 90 TABLET | Refills: 2 | Status: SHIPPED | OUTPATIENT
Start: 2023-04-19

## 2023-04-19 RX ORDER — LINACLOTIDE 145 UG/1
145 CAPSULE, GELATIN COATED ORAL
Qty: 30 CAPSULE | Refills: 11 | Status: CANCELLED | OUTPATIENT
Start: 2023-04-19

## 2023-04-19 RX ORDER — CEPHALEXIN 500 MG/1
1000 CAPSULE ORAL 2 TIMES DAILY
Qty: 40 CAPSULE | Refills: 0 | Status: SHIPPED | OUTPATIENT
Start: 2023-04-19

## 2023-04-19 NOTE — PROGRESS NOTES
Chief Complaint  Arm Pain (Right arm pain. Got flu shot in February and it was swollen for 4 days. Swelling has resolved but pain is still there, cant move arm. )  This was a video and audio enabled telemedicine encounter. The patient was in home. The provider was in office.     Subjective          Heidi Webb presents to [facility]  History of Present Illness  Pt is here for refills on her medications. She has had right arm pain since getting her flu shot in Feb. She states there is a hard knot at the injection site. She states her gabapentin is working well.       Objective   Vital Signs:   There were no vitals taken for this visit.    There is no height or weight on file to calculate BMI.    Review of Systems   Constitutional: Negative for fatigue and fever.   Respiratory: Negative for shortness of breath.    Cardiovascular: Negative for chest pain, palpitations and leg swelling.   Neurological: Negative for syncope.   Psychiatric/Behavioral: The patient is not nervous/anxious.           Current Outpatient Medications:   •  albuterol sulfate  (90 Base) MCG/ACT inhaler, Inhale 2 puffs Every 4 (Four) Hours As Needed for Wheezing., Disp: 18 g, Rfl: 2  •  buprenorphine-naloxone (SUBOXONE) 8-2 MG per SL tablet, , Disp: , Rfl:   •  FLUoxetine (PROzac) 20 MG capsule, Take 1 capsule by mouth Daily., Disp: , Rfl:   •  gabapentin (Neurontin) 800 MG tablet, Take 1 tablet by mouth 4 (Four) Times a Day. Cancel the 600mg prescription please and the 90 quantity, Disp: 120 tablet, Rfl: 2  •  ibuprofen (ADVIL,MOTRIN) 800 MG tablet, Take 1 tablet by mouth Every 8 (Eight) Hours As Needed for Moderate Pain., Disp: 90 tablet, Rfl: 2  •  Linzess 145 MCG capsule capsule, Take 1 capsule by mouth Every Morning Before Breakfast., Disp: 30 capsule, Rfl: 11  •  ondansetron (Zofran) 8 MG tablet, Take 1 tablet by mouth Every 8 (Eight) Hours As Needed for Nausea or Vomiting., Disp: 30 tablet, Rfl: 0  •   cephalexin (Keflex) 500 MG capsule, Take 2 capsules by mouth 2 (Two) Times a Day., Disp: 40 capsule, Rfl: 0      Allergies: Sulfa antibiotics and Ciprofloxacin    Physical Exam  Constitutional:       Appearance: Normal appearance.   Neurological:      Mental Status: She is alert.   Psychiatric:         Mood and Affect: Mood normal.         Behavior: Behavior normal.         Thought Content: Thought content normal.         Judgment: Judgment normal.          Result Review :                   Assessment and Plan    Diagnoses and all orders for this visit:    1. Right arm pain (Primary)  Comments:  Treat for cellulitis. Apply ice/heat to painful areas. Return to the office for in person appt if not improving.   Orders:  -     cephalexin (Keflex) 500 MG capsule; Take 2 capsules by mouth 2 (Two) Times a Day.  Dispense: 40 capsule; Refill: 0    2. Chronic idiopathic constipation  Comments:  Continue Linzess as needed. Increase fluids and fiber in diet.    3. Moderate persistent asthma, unspecified whether complicated  Comments:  Albuterol as needed.   Orders:  -     albuterol sulfate  (90 Base) MCG/ACT inhaler; Inhale 2 puffs Every 4 (Four) Hours As Needed for Wheezing.  Dispense: 18 g; Refill: 2    4. Neuropathy  Comments:  Continue Gabapentin. Return for worsened sx.   Orders:  -     gabapentin (Neurontin) 800 MG tablet; Take 1 tablet by mouth 4 (Four) Times a Day. Cancel the 600mg prescription please and the 90 quantity  Dispense: 120 tablet; Refill: 2    5. Lumbar pain  Comments:  Continue Ibuprofen.   Orders:  -     gabapentin (Neurontin) 800 MG tablet; Take 1 tablet by mouth 4 (Four) Times a Day. Cancel the 600mg prescription please and the 90 quantity  Dispense: 120 tablet; Refill: 2  -     ibuprofen (ADVIL,MOTRIN) 800 MG tablet; Take 1 tablet by mouth Every 8 (Eight) Hours As Needed for Moderate Pain.  Dispense: 90 tablet; Refill: 2                Follow Up   Return in about 3 months (around 7/19/2023) for  Recheck.  Patient was given instructions and counseling regarding her condition or for health maintenance advice. Please see specific information pulled into the AVS if appropriate.     Anisa Conteh APRN

## 2023-05-11 ENCOUNTER — OFFICE VISIT (OUTPATIENT)
Dept: OBSTETRICS AND GYNECOLOGY | Facility: CLINIC | Age: 38
End: 2023-05-11
Payer: MEDICAID

## 2023-05-11 VITALS
SYSTOLIC BLOOD PRESSURE: 126 MMHG | RESPIRATION RATE: 18 BRPM | WEIGHT: 172 LBS | BODY MASS INDEX: 30.48 KG/M2 | DIASTOLIC BLOOD PRESSURE: 88 MMHG | HEIGHT: 63 IN

## 2023-05-11 DIAGNOSIS — Z01.419 ENCOUNTER FOR ANNUAL ROUTINE GYNECOLOGICAL EXAMINATION: ICD-10-CM

## 2023-05-11 DIAGNOSIS — Z11.3 SCREENING EXAMINATION FOR STD (SEXUALLY TRANSMITTED DISEASE): ICD-10-CM

## 2023-05-11 DIAGNOSIS — R10.30 LOWER ABDOMINAL PAIN: ICD-10-CM

## 2023-05-11 DIAGNOSIS — G89.29 CHRONIC BILATERAL LOW BACK PAIN WITH RIGHT-SIDED SCIATICA: ICD-10-CM

## 2023-05-11 DIAGNOSIS — R63.5 ABNORMAL WEIGHT GAIN: ICD-10-CM

## 2023-05-11 DIAGNOSIS — D25.1 INTRAMURAL LEIOMYOMA OF UTERUS: ICD-10-CM

## 2023-05-11 DIAGNOSIS — N93.9 ABNORMAL UTERINE BLEEDING (AUB): Primary | ICD-10-CM

## 2023-05-11 DIAGNOSIS — Z12.4 SCREENING FOR CERVICAL CANCER: ICD-10-CM

## 2023-05-11 DIAGNOSIS — N94.10 FEMALE DYSPAREUNIA: ICD-10-CM

## 2023-05-11 DIAGNOSIS — M54.41 CHRONIC BILATERAL LOW BACK PAIN WITH RIGHT-SIDED SCIATICA: ICD-10-CM

## 2023-05-11 RX ORDER — MEDROXYPROGESTERONE ACETATE 10 MG/1
10 TABLET ORAL DAILY
Qty: 10 TABLET | Refills: 1 | Status: SHIPPED | OUTPATIENT
Start: 2023-05-11 | End: 2023-05-21

## 2023-05-11 NOTE — PROGRESS NOTES
Gynecologic Annual Exam Note        Gynecologic Exam (Irregular periods and abdominal pain)        Subjective     HPI  Heidi Webb is a 37 y.o. No obstetric history on file. female who presents for annual well woman exam as a new patient. There were no changes to her medical or surgical history since her last visit.. Patient reports problems with: abnormal bleeding.  The patient uses 1 of tampons/pads per hour.. Patient's last menstrual period was 05/01/2023 (exact date).. Her periods occur every 21 days or less, lasting 5 days. The flow is heavy saturating a pad/tampon every 2 hours. This heavy bleeding lasts for 5 days of her period... She reports dysmenorrhea is moderate, occurring first 1-2 days of flow. She also reports abdominal pain. Partner Status: Marital Status: .  She is sexually active. She has not had new partners.. STD testing recommendations have been explained to the patient and she does desire STD testing.    Additional OB/GYN History   Current contraception: contraceptive methods: Tubal ligation  Desires to: do not start contraception  Thromboembolic Disease: none  Age of menarche: 9    History of STD: no    Last Pap : over 3 years. Results: negative. HPV: unknown .   Last Completed Pap Smear     This patient has no relevant Health Maintenance data.           History of abnormal Pap smear: yes - 2006, pre cancerous cells  Gardasil status:missed  Family history of uterine, colon, breast, or ovarian cancer: no  Performs monthly Self-Breast Exam: yes  Exercises Regularly:yes  Feelings of Anxiety or Depression: yes - patient takes prozac  Tobacco Usage?: Yes Heidi Webb  reports that she has been smoking cigarettes. She started smoking about 27 years ago. She has a 26.00 pack-year smoking history. She has never used smokeless tobacco.. I have educated her on the risk of diseases from using tobacco products such as cancer, COPD and heart disease.     I advised her to quit and she is  not willing to quit.     I spent 5 minutes counseling the patient.            Current Outpatient Medications:   •  albuterol sulfate  (90 Base) MCG/ACT inhaler, Inhale 2 puffs Every 4 (Four) Hours As Needed for Wheezing., Disp: 18 g, Rfl: 2  •  buprenorphine-naloxone (SUBOXONE) 8-2 MG per SL tablet, , Disp: , Rfl:   •  cephalexin (Keflex) 500 MG capsule, Take 2 capsules by mouth 2 (Two) Times a Day., Disp: 40 capsule, Rfl: 0  •  FLUoxetine (PROzac) 20 MG capsule, Take 1 capsule by mouth Daily., Disp: , Rfl:   •  gabapentin (Neurontin) 800 MG tablet, Take 1 tablet by mouth 4 (Four) Times a Day. Cancel the 600mg prescription please and the 90 quantity, Disp: 120 tablet, Rfl: 2  •  ibuprofen (ADVIL,MOTRIN) 800 MG tablet, Take 1 tablet by mouth Every 8 (Eight) Hours As Needed for Moderate Pain., Disp: 90 tablet, Rfl: 2  •  Linzess 145 MCG capsule capsule, Take 1 capsule by mouth Every Morning Before Breakfast., Disp: 30 capsule, Rfl: 11  •  ondansetron (Zofran) 8 MG tablet, Take 1 tablet by mouth Every 8 (Eight) Hours As Needed for Nausea or Vomiting., Disp: 30 tablet, Rfl: 0     Patient denies the need for medication refills today.    OB History    No obstetric history on file.         Health Maintenance   Topic Date Due   • Annual Gynecologic Pelvic and Breast Exam  Never done   • Pneumococcal Vaccine 0-64 (1 - PCV) Never done   • TDAP/TD VACCINES (1 - Tdap) Never done   • ANNUAL PHYSICAL  Never done   • PAP SMEAR  Never done   • COVID-19 Vaccine (2 - Booster for Moderna series) 03/18/2023   • INFLUENZA VACCINE  08/01/2023   • HEPATITIS C SCREENING  Completed       Past Medical History:   Diagnosis Date   • Anxiety    • Cardiac arrhythmia     apparently   • Heart failure         No past surgical history on file.    The additional following portions of the patient's history were reviewed and updated as appropriate: allergies, current medications, past family history, past medical history, past social history,  "past surgical history and problem list.    Review of Systems   Constitutional: Positive for unexpected weight gain. Negative for chills and fever.   Respiratory: Negative.    Cardiovascular: Negative.    Gastrointestinal: Positive for abdominal pain. Negative for abdominal distention.   Genitourinary: Positive for dyspareunia and menstrual problem. Negative for breast discharge, breast lump, breast pain, dysuria, frequency, genital sores, vaginal bleeding and vaginal discharge.   Psychiatric/Behavioral: Negative for dysphoric mood and depressed mood.         I have reviewed and agree with the HPI, ROS, and historical information as entered above. Anand Carrera MD        Objective   /88   Resp 18   Ht 160 cm (62.99\")   Wt 78 kg (172 lb)   LMP 05/01/2023 (Exact Date)   BMI 30.48 kg/m²     Physical Exam  Vitals and nursing note reviewed. Exam conducted with a chaperone present.   Constitutional:       Appearance: She is well-developed. She is obese.   HENT:      Head: Normocephalic and atraumatic.   Neck:      Thyroid: No thyroid mass or thyromegaly.   Cardiovascular:      Rate and Rhythm: Normal rate and regular rhythm.      Heart sounds: Normal heart sounds. No murmur heard.  Pulmonary:      Effort: Pulmonary effort is normal. No retractions.      Breath sounds: Normal breath sounds. No wheezing, rhonchi or rales.   Chest:      Chest wall: No mass or tenderness.   Breasts:     Right: Normal. No mass, nipple discharge, skin change or tenderness.      Left: Normal. No mass, nipple discharge, skin change or tenderness.   Abdominal:      General: Bowel sounds are normal.      Palpations: Abdomen is soft. Abdomen is not rigid. There is no mass.      Tenderness: There is no abdominal tenderness. There is no guarding.      Hernia: No hernia is present. There is no hernia in the left inguinal area.   Genitourinary:     General: Normal vulva.      Labia:         Right: No rash, tenderness or lesion.         " Left: No rash, tenderness or lesion.       Vagina: Normal. No vaginal discharge, bleeding or lesions.      Cervix: No cervical motion tenderness, discharge, lesion or cervical bleeding.      Uterus: Normal. Not enlarged, not fixed and not tender.       Adnexa:         Right: No mass or tenderness.          Left: No mass or tenderness.        Rectum: No external hemorrhoid.      Comments: No vaginal discharge; Cx without lesions; bleeding with pap smear.   Musculoskeletal:      Cervical back: Normal range of motion. No muscular tenderness.   Neurological:      Mental Status: She is alert and oriented to person, place, and time.   Psychiatric:         Behavior: Behavior normal.            Assessment and Plan    Problem List Items Addressed This Visit     Screening for cervical cancer    Overview     Pap smear 2012 in [place].  H/o Abn pap 2006; resolved without treatment.   5/11/2023 Pap with HPV done         Abnormal uterine bleeding (AUB) - Primary    Overview     Used to have regular menses  Onset AUB Feb 2023;         Relevant Orders    US Non-ob Transvaginal    Abnormal weight gain    Overview     Weight gain from 158 to 172 pounds over 3 months         Chronic bilateral low back pain with right-sided sciatica    Overview     Lower abdominal pain could be referred pain from DDD.         Lower abdominal pain    Overview     Bilateral lower abdominal pain.  Ultrasound 5/11/2023         Female dyspareunia   Other Visit Diagnoses     Encounter for annual routine gynecological examination        Relevant Orders    LIQUID-BASED PAP SMEAR WITH HPV GENOTYPING REGARDLESS OF INTERPRETATION ([facility])    Screening examination for STD (sexually transmitted disease)              1. GYN annual well woman exam. 36 yo.   2. AUB; used to have regular menses; Bleeding 2-3 times/ month over last 3 months.  Ultrasound was negative except for a 1.5 cm intramural fibroid.  Endometrial measurement was 4 mm.  Can try Provera 10 mg  for 10 days to see if bleeding improves.  3. Weight gain over last 3 months.  Check labs and fasting insulin.  4. Bilateral lower abdominal pain. U/s done today was negative; Could be referred pain from DDD.  5. Bilateral back pain with right sciatica. F/u with PCP.  6. Last pap 2012; h/o abnormal pap . Pap with HPV done. Reviewed pap guidelines.   7. Reviewed monthly self breast exams.  Instructed to call with lumps, pain, or breast discharge.    8. Reviewed BMI and weight loss as preventative health measures.   9. Reccommended Flu Vaccine in Fall of each year.  No follow-ups on file.      Anand Carrera MD  05/11/2023

## 2023-05-12 LAB
ALBUMIN SERPL-MCNC: 4.3 G/DL (ref 3.8–4.8)
ALBUMIN/GLOB SERPL: 1.7 {RATIO} (ref 1.2–2.2)
ALP SERPL-CCNC: 54 IU/L (ref 44–121)
ALT SERPL-CCNC: 10 IU/L (ref 0–32)
AST SERPL-CCNC: 12 IU/L (ref 0–40)
BILIRUB SERPL-MCNC: 0.3 MG/DL (ref 0–1.2)
BUN SERPL-MCNC: 14 MG/DL (ref 6–20)
BUN/CREAT SERPL: 19 (ref 9–23)
CALCIUM SERPL-MCNC: 9.1 MG/DL (ref 8.7–10.2)
CHLORIDE SERPL-SCNC: 102 MMOL/L (ref 96–106)
CO2 SERPL-SCNC: 24 MMOL/L (ref 20–29)
CREAT SERPL-MCNC: 0.72 MG/DL (ref 0.57–1)
EGFRCR SERPLBLD CKD-EPI 2021: 110 ML/MIN/1.73
ERYTHROCYTE [DISTWIDTH] IN BLOOD BY AUTOMATED COUNT: 12.9 % (ref 11.7–15.4)
FSH SERPL-ACNC: 48.1 MIU/ML
GLOBULIN SER CALC-MCNC: 2.5 G/DL (ref 1.5–4.5)
GLUCOSE SERPL-MCNC: 106 MG/DL (ref 70–99)
HCT VFR BLD AUTO: 34.3 % (ref 34–46.6)
HGB BLD-MCNC: 11.6 G/DL (ref 11.1–15.9)
INSULIN SERPL-ACNC: NORMAL U[IU]/ML
LH SERPL-ACNC: 27.4 MIU/ML
MCH RBC QN AUTO: 28.7 PG (ref 26.6–33)
MCHC RBC AUTO-ENTMCNC: 33.8 G/DL (ref 31.5–35.7)
MCV RBC AUTO: 85 FL (ref 79–97)
PLATELET # BLD AUTO: 279 X10E3/UL (ref 150–450)
POTASSIUM SERPL-SCNC: 4.3 MMOL/L (ref 3.5–5.2)
PROT SERPL-MCNC: 6.8 G/DL (ref 6–8.5)
RBC # BLD AUTO: 4.04 X10E6/UL (ref 3.77–5.28)
SODIUM SERPL-SCNC: 139 MMOL/L (ref 134–144)
TSH SERPL DL<=0.005 MIU/L-ACNC: 2.02 UIU/ML (ref 0.45–4.5)
WBC # BLD AUTO: 8.2 X10E3/UL (ref 3.4–10.8)

## 2023-05-16 LAB — REF LAB TEST METHOD: NORMAL

## 2023-05-17 ENCOUNTER — DOCUMENTATION (OUTPATIENT)
Dept: OBSTETRICS AND GYNECOLOGY | Facility: CLINIC | Age: 38
End: 2023-05-17
Payer: MEDICAID

## 2023-05-17 ENCOUNTER — TELEPHONE (OUTPATIENT)
Dept: OBSTETRICS AND GYNECOLOGY | Facility: CLINIC | Age: 38
End: 2023-05-17

## 2023-05-17 NOTE — TELEPHONE ENCOUNTER
Caller: Heidi Webb    Relationship: Self    Best call back number:[phone]    Caller requesting test results: SELF    What test was performed: LABS    When was the test performed: 05/11    Where was the test performed:     Additional notes: OK TO CALLBACK ANYTIME, OK TO LEAVE A VM    SEEN RESULTS ON [facility], HAS QUESTIONS.

## 2023-05-18 DIAGNOSIS — N93.9 ABNORMAL UTERINE BLEEDING (AUB): Primary | ICD-10-CM

## 2023-05-18 LAB
ALBUMIN SERPL-MCNC: 4.3 G/DL (ref 3.8–4.8)
ALBUMIN/GLOB SERPL: 1.7 {RATIO} (ref 1.2–2.2)
ALP SERPL-CCNC: 54 IU/L (ref 44–121)
ALT SERPL-CCNC: 10 IU/L (ref 0–32)
AST SERPL-CCNC: 12 IU/L (ref 0–40)
BILIRUB SERPL-MCNC: 0.3 MG/DL (ref 0–1.2)
BUN SERPL-MCNC: 14 MG/DL (ref 6–20)
BUN/CREAT SERPL: 19 (ref 9–23)
CALCIUM SERPL-MCNC: 9.1 MG/DL (ref 8.7–10.2)
CHLORIDE SERPL-SCNC: 102 MMOL/L (ref 96–106)
CO2 SERPL-SCNC: 24 MMOL/L (ref 20–29)
CREAT SERPL-MCNC: 0.72 MG/DL (ref 0.57–1)
EGFRCR SERPLBLD CKD-EPI 2021: 110 ML/MIN/1.73
ERYTHROCYTE [DISTWIDTH] IN BLOOD BY AUTOMATED COUNT: 12.9 % (ref 11.7–15.4)
FSH SERPL-ACNC: 48.1 MIU/ML
GLOBULIN SER CALC-MCNC: 2.5 G/DL (ref 1.5–4.5)
GLUCOSE SERPL-MCNC: 106 MG/DL (ref 70–99)
HCT VFR BLD AUTO: 34.3 % (ref 34–46.6)
HGB BLD-MCNC: 11.6 G/DL (ref 11.1–15.9)
INSULIN SERPL-ACNC: 8 UIU/ML
LH SERPL-ACNC: 27.4 MIU/ML
MCH RBC QN AUTO: 28.7 PG (ref 26.6–33)
MCHC RBC AUTO-ENTMCNC: 33.8 G/DL (ref 31.5–35.7)
MCV RBC AUTO: 85 FL (ref 79–97)
PLATELET # BLD AUTO: 279 X10E3/UL (ref 150–450)
POTASSIUM SERPL-SCNC: 4.3 MMOL/L (ref 3.5–5.2)
PROT SERPL-MCNC: 6.8 G/DL (ref 6–8.5)
RBC # BLD AUTO: 4.04 X10E6/UL (ref 3.77–5.28)
SODIUM SERPL-SCNC: 139 MMOL/L (ref 134–144)
TSH SERPL DL<=0.005 MIU/L-ACNC: 2.02 UIU/ML (ref 0.45–4.5)
WBC # BLD AUTO: 8.2 X10E3/UL (ref 3.4–10.8)

## 2023-05-25 ENCOUNTER — LAB (OUTPATIENT)
Dept: OBSTETRICS AND GYNECOLOGY | Facility: CLINIC | Age: 38
End: 2023-05-25
Payer: MEDICAID

## 2023-05-25 DIAGNOSIS — R63.5 ABNORMAL WEIGHT GAIN: Primary | ICD-10-CM

## 2023-05-26 LAB
ESTRADIOL SERPL-MCNC: 18 PG/ML
FSH SERPL-ACNC: 55.5 MIU/ML

## 2023-05-31 ENCOUNTER — TELEPHONE (OUTPATIENT)
Dept: OBSTETRICS AND GYNECOLOGY | Facility: CLINIC | Age: 38
End: 2023-05-31

## 2023-05-31 NOTE — TELEPHONE ENCOUNTER
Caller: Heidi Webb    Relationship to patient: Self    Best call back number: [phone]    Patient is needing:     PT IS HAVING VERY BAD HOT FLASHES AND ABD PAIN    SHE WOULD LIKE TO HAVE SOMETHING CALLED IN FOR THE PAIN    [facility] [place]    PLEASE ADVISE    OKAY TO CALL ANY TIME  OKAY TO [facility]

## 2023-05-31 NOTE — TELEPHONE ENCOUNTER
Patient states she is having hot flashes and her FSH is consistent with menopause after being repeated. Patient would like to come in and see if she can go on something to help with the hot flashes. Patient states she is still having abdominal pain, patient had U/S at the last visit only showed a intramural fibroid. She is coming in next week to see Hali pt v/u.

## 2023-06-06 NOTE — PROGRESS NOTES
Chief Complaint   Patient presents with    Hot Flashes    Abdominal Pain       Subjective   HPI  Heidi Webb is a 37 y.o. female, .   Patient's last menstrual period was 2023 (exact date).. who presents for follow up on hot flashes and irregular bleeding.  On 23, she saw Dr Carrera.  US at that time showed a 1 cm intramural fibroid, otherwise wnl.  FSH was c/w menopause; repeat labs say the same.  She stopped bleeding and did not take the Provera Rx.  The abdominal pain has been present for several months and is located on the lower midline.      Additional OB/GYN History     Last Pap :   Last Completed Pap Smear            PAP SMEAR (Every 3 Years) Next due on 2023  LIQUID-BASED PAP SMEAR WITH HPV GENOTYPING REGARDLESS OF INTERPRETATION ([facility]LL,[facility]AD)                    Tobacco Usage?: Yes Heidi Webb  reports that she has been smoking cigarettes. She started smoking about 27 years ago. She has a 26.00 pack-year smoking history. She has never used smokeless tobacco.. .        OB History          8    Para   7    Term   7       0    AB   1    Living   7         SAB   1    IAB   0    Ectopic   0    Molar   0    Multiple   0    Live Births   7                  Current Outpatient Medications:     albuterol sulfate  (90 Base) MCG/ACT inhaler, Inhale 2 puffs Every 4 (Four) Hours As Needed for Wheezing., Disp: 18 g, Rfl: 2    buprenorphine-naloxone (SUBOXONE) 8-2 MG per SL tablet, , Disp: , Rfl:     FLUoxetine (PROzac) 20 MG capsule, Take 1 capsule by mouth Daily., Disp: , Rfl:     gabapentin (Neurontin) 800 MG tablet, Take 1 tablet by mouth 4 (Four) Times a Day. Cancel the 600mg prescription please and the 90 quantity, Disp: 120 tablet, Rfl: 2    ibuprofen (ADVIL,MOTRIN) 800 MG tablet, Take 1 tablet by mouth Every 8 (Eight) Hours As Needed for Moderate Pain., Disp: 90 tablet, Rfl: 2    Linzess 145 MCG capsule capsule, Take 1 capsule by mouth Every  "Morning Before Breakfast., Disp: 30 capsule, Rfl: 11    medroxyPROGESTERone (Provera) 10 MG tablet, Take 1 tablet by mouth Daily., Disp: 30 tablet, Rfl: 6     Past Medical History:   Diagnosis Date    Anxiety     Cardiac arrhythmia     apparently    Heart failure         History reviewed. No pertinent surgical history.    The additional following portions of the patient's history were reviewed and updated as appropriate: allergies, current medications, past family history, past medical history, past social history, past surgical history, and problem list.    Review of Systems    I have reviewed and agree with the HPI, ROS, and historical information as entered above. Hali Rizvi Melany, APRN      Objective   /86   Resp 18   Ht 160 cm (62.99\")   Wt 80.3 kg (177 lb)   LMP 05/01/2023 (Exact Date)   BMI 31.36 kg/m²     Physical Exam  Vitals and nursing note reviewed.   Constitutional:       General: She is not in acute distress.     Appearance: Normal appearance. She is not ill-appearing.   Pulmonary:      Effort: Pulmonary effort is normal. No respiratory distress.   Skin:     General: Skin is warm and dry.   Neurological:      Mental Status: She is alert and oriented to person, place, and time.   Psychiatric:         Mood and Affect: Mood normal.         Behavior: Behavior normal.       Assessment & Plan     Assessment     Problem List Items Addressed This Visit          Genitourinary and Reproductive     Abnormal uterine bleeding (AUB) - Primary    Overview     Used to have regular menses  Onset AUB Feb 2023;         Relevant Medications    medroxyPROGESTERone (Provera) 10 MG tablet    Intramural leiomyoma of uterus    Overview     1.5 cm right IM fibroid.           Other Visit Diagnoses       Menopausal state                Plan     D/w pt GYN exam and US essentially wnl; no reason for abd pain noted.  See PCP or pain management for pain likely referred from DDD.  D/w JTA menopause symptoms.  Since she is " young and she could come out of menopause, low dose HRT recommended.  Pt reports a hx of MI x2--- no estrogen.  Take Provera for now.  Only other option is adding Effexor.  She is already on gabapentin.     Hali Mosley, APRN  06/07/2023

## 2023-06-07 ENCOUNTER — TELEPHONE (OUTPATIENT)
Dept: FAMILY MEDICINE CLINIC | Facility: CLINIC | Age: 38
End: 2023-06-07
Payer: MEDICAID

## 2023-06-07 ENCOUNTER — OFFICE VISIT (OUTPATIENT)
Dept: OBSTETRICS AND GYNECOLOGY | Facility: CLINIC | Age: 38
End: 2023-06-07
Payer: MEDICAID

## 2023-06-07 VITALS
WEIGHT: 177 LBS | DIASTOLIC BLOOD PRESSURE: 86 MMHG | RESPIRATION RATE: 18 BRPM | BODY MASS INDEX: 31.36 KG/M2 | HEIGHT: 63 IN | SYSTOLIC BLOOD PRESSURE: 122 MMHG

## 2023-06-07 DIAGNOSIS — N93.9 ABNORMAL UTERINE BLEEDING (AUB): Primary | ICD-10-CM

## 2023-06-07 DIAGNOSIS — N95.1 MENOPAUSAL STATE: ICD-10-CM

## 2023-06-07 DIAGNOSIS — D25.1 INTRAMURAL LEIOMYOMA OF UTERUS: ICD-10-CM

## 2023-06-07 PROBLEM — F19.11 HX OF SUBSTANCE ABUSE: Status: ACTIVE | Noted: 2023-06-07

## 2023-06-07 RX ORDER — MEDROXYPROGESTERONE ACETATE 10 MG/1
10 TABLET ORAL DAILY
Qty: 30 TABLET | Refills: 6 | Status: SHIPPED | OUTPATIENT
Start: 2023-06-07

## 2023-06-07 NOTE — TELEPHONE ENCOUNTER
Caller: Heidi Webb    Relationship: Self    Best call back number: [phone]     What form or medical record are you requesting: MEDICATION STATEMENT    Who is requesting this form or medical record from you:   [place] [address]    How would you like to receive the form or medical records (pick-up, mail, fax): FAX  If fax, what is the fax number: [phone]    Timeframe paperwork needed: AS SOON AS POSSIBLE    Additional notes: THE PATIENT STATES THAT [facility] NEED A FAX THAT STATES THAT SHE IS TAKING GABAPENTIN DUE TO NEUROPATHY. SHE STATES THAT THEY WILL NOT ADMINISTER ANY MEDICATION UNTIL THEY RECEIVE THE FAX AND SHE WOULD LIKE THIS COMPLETED TODAY, IF POSSIBLE.     THANK YOU.

## 2023-07-13 PROBLEM — M54.41 CHRONIC BILATERAL LOW BACK PAIN WITH RIGHT-SIDED SCIATICA: Status: RESOLVED | Noted: 2023-05-11 | Resolved: 2023-07-13

## 2023-07-13 PROBLEM — G89.29 CHRONIC BILATERAL LOW BACK PAIN WITH RIGHT-SIDED SCIATICA: Status: RESOLVED | Noted: 2023-05-11 | Resolved: 2023-07-13

## 2023-07-20 ENCOUNTER — TELEPHONE (OUTPATIENT)
Dept: FAMILY MEDICINE CLINIC | Facility: CLINIC | Age: 38
End: 2023-07-20
Payer: MEDICAID

## 2023-07-20 NOTE — TELEPHONE ENCOUNTER
Caller: Jon Heidi    Relationship: Self    Best call back number: [phone]    What medication are you requesting: NICOTINE PATCHES    What are your current symptoms: QUIT SMOKING     How long have you been experiencing symptoms: N/AA    Have you had these symptoms before:    [x] Yes  [] No    Have you been treated for these symptoms before:   [x] Yes  [] No    If a prescription is needed, what is your preferred pharmacy and phone number: [id number] - [place], [place] - [address] - [phone]  - [phone] FX     Additional notes: PATIENT STATED THAT SHE HAS BEEN PRESCRIBED THESE BEFORE AND WOULD LIKE TO SEE ABOUT GETTING THEM AGAIN     PLEASE

## 2023-07-28 RX ORDER — NICOTINE 21 MG/24HR
1 PATCH, TRANSDERMAL 24 HOURS TRANSDERMAL EVERY 24 HOURS
Qty: 30 PATCH | Refills: 1 | Status: SHIPPED | OUTPATIENT
Start: 2023-07-28

## 2023-07-28 NOTE — TELEPHONE ENCOUNTER
Hub to read: left message   Sent. We will follow up and hopefully taper the dose down. She should try to taper down her cigarettes or stop cold turkey. Thanks!        Left message x 2

## 2023-07-28 NOTE — TELEPHONE ENCOUNTER
Hub to read: left message   Sent. We will follow up and hopefully taper the dose down. She should try to taper down her cigarettes or stop cold turkey. Thanks!

## 2023-07-28 NOTE — TELEPHONE ENCOUNTER
Sent. We will follow up and hopefully taper the dose down. She should try to taper down her cigarettes or stop cold turkey. Thanks!

## 2023-08-18 ENCOUNTER — HOSPITAL ENCOUNTER (EMERGENCY)
Facility: HOSPITAL | Age: 38
Discharge: LEFT BEFORE DISCHARGE | End: 2023-08-18
Attending: EMERGENCY MEDICINE
Payer: MEDICAID

## 2023-08-18 ENCOUNTER — TELEPHONE (OUTPATIENT)
Dept: FAMILY MEDICINE CLINIC | Facility: CLINIC | Age: 38
End: 2023-08-18

## 2023-08-18 ENCOUNTER — APPOINTMENT (OUTPATIENT)
Dept: CT IMAGING | Facility: HOSPITAL | Age: 38
End: 2023-08-18
Payer: MEDICAID

## 2023-08-18 ENCOUNTER — PATIENT MESSAGE (OUTPATIENT)
Dept: FAMILY MEDICINE CLINIC | Facility: CLINIC | Age: 38
End: 2023-08-18

## 2023-08-18 VITALS
BODY MASS INDEX: 31.18 KG/M2 | SYSTOLIC BLOOD PRESSURE: 141 MMHG | OXYGEN SATURATION: 97 % | WEIGHT: 176 LBS | RESPIRATION RATE: 14 BRPM | HEART RATE: 60 BPM | HEIGHT: 63 IN | TEMPERATURE: 98.9 F | DIASTOLIC BLOOD PRESSURE: 93 MMHG

## 2023-08-18 DIAGNOSIS — R10.9 ACUTE FLANK PAIN: ICD-10-CM

## 2023-08-18 DIAGNOSIS — R10.9 ACUTE ABDOMINAL PAIN: Primary | ICD-10-CM

## 2023-08-18 LAB
ALBUMIN SERPL-MCNC: 4 G/DL (ref 3.5–5.2)
ALBUMIN/GLOB SERPL: 1.3 G/DL
ALP SERPL-CCNC: 62 U/L (ref 39–117)
ALT SERPL W P-5'-P-CCNC: 7 U/L (ref 1–33)
ANION GAP SERPL CALCULATED.3IONS-SCNC: 10 MMOL/L (ref 5–15)
AST SERPL-CCNC: 13 U/L (ref 1–32)
B-HCG UR QL: NEGATIVE
BACTERIA UR QL AUTO: ABNORMAL /HPF
BASOPHILS # BLD AUTO: 0.1 10*3/MM3 (ref 0–0.2)
BASOPHILS NFR BLD AUTO: 1.3 % (ref 0–1.5)
BILIRUB SERPL-MCNC: <0.2 MG/DL (ref 0–1.2)
BILIRUB UR QL STRIP: ABNORMAL
BUN SERPL-MCNC: 15 MG/DL (ref 6–20)
BUN/CREAT SERPL: 20 (ref 7–25)
CALCIUM SPEC-SCNC: 8.9 MG/DL (ref 8.6–10.5)
CHLORIDE SERPL-SCNC: 105 MMOL/L (ref 98–107)
CLARITY UR: ABNORMAL
CO2 SERPL-SCNC: 25 MMOL/L (ref 22–29)
COLOR UR: ABNORMAL
CREAT SERPL-MCNC: 0.75 MG/DL (ref 0.57–1)
DEPRECATED RDW RBC AUTO: 43.3 FL (ref 37–54)
EGFRCR SERPLBLD CKD-EPI 2021: 104 ML/MIN/1.73
EOSINOPHIL # BLD AUTO: 0.58 10*3/MM3 (ref 0–0.4)
EOSINOPHIL NFR BLD AUTO: 7.6 % (ref 0.3–6.2)
ERYTHROCYTE [DISTWIDTH] IN BLOOD BY AUTOMATED COUNT: 13.4 % (ref 12.3–15.4)
EXPIRATION DATE: NORMAL
FLUAV RNA RESP QL NAA+PROBE: NOT DETECTED
FLUBV RNA RESP QL NAA+PROBE: NOT DETECTED
GLOBULIN UR ELPH-MCNC: 3 GM/DL
GLUCOSE SERPL-MCNC: 107 MG/DL (ref 65–99)
GLUCOSE UR STRIP-MCNC: NEGATIVE MG/DL
HCT VFR BLD AUTO: 33.4 % (ref 34–46.6)
HGB BLD-MCNC: 10.9 G/DL (ref 12–15.9)
HGB UR QL STRIP.AUTO: ABNORMAL
HOLD SPECIMEN: NORMAL
HYALINE CASTS UR QL AUTO: ABNORMAL /LPF
IMM GRANULOCYTES # BLD AUTO: 0.02 10*3/MM3 (ref 0–0.05)
IMM GRANULOCYTES NFR BLD AUTO: 0.3 % (ref 0–0.5)
INTERNAL NEGATIVE CONTROL: NEGATIVE
INTERNAL POSITIVE CONTROL: POSITIVE
KETONES UR QL STRIP: NEGATIVE
LEUKOCYTE ESTERASE UR QL STRIP.AUTO: ABNORMAL
LIPASE SERPL-CCNC: 19 U/L (ref 13–60)
LYMPHOCYTES # BLD AUTO: 2.8 10*3/MM3 (ref 0.7–3.1)
LYMPHOCYTES NFR BLD AUTO: 36.8 % (ref 19.6–45.3)
Lab: NORMAL
MCH RBC QN AUTO: 28.8 PG (ref 26.6–33)
MCHC RBC AUTO-ENTMCNC: 32.6 G/DL (ref 31.5–35.7)
MCV RBC AUTO: 88.1 FL (ref 79–97)
MONOCYTES # BLD AUTO: 0.5 10*3/MM3 (ref 0.1–0.9)
MONOCYTES NFR BLD AUTO: 6.6 % (ref 5–12)
NEUTROPHILS NFR BLD AUTO: 3.6 10*3/MM3 (ref 1.7–7)
NEUTROPHILS NFR BLD AUTO: 47.4 % (ref 42.7–76)
NITRITE UR QL STRIP: POSITIVE
NRBC BLD AUTO-RTO: 0 /100 WBC (ref 0–0.2)
PH UR STRIP.AUTO: 5.5 [PH] (ref 5–8)
PLATELET # BLD AUTO: 252 10*3/MM3 (ref 140–450)
PMV BLD AUTO: 8.6 FL (ref 6–12)
POTASSIUM SERPL-SCNC: 4 MMOL/L (ref 3.5–5.2)
PROT SERPL-MCNC: 7 G/DL (ref 6–8.5)
PROT UR QL STRIP: ABNORMAL
RBC # BLD AUTO: 3.79 10*6/MM3 (ref 3.77–5.28)
RBC # UR STRIP: ABNORMAL /HPF
REF LAB TEST METHOD: ABNORMAL
SARS-COV-2 RNA RESP QL NAA+PROBE: NOT DETECTED
SODIUM SERPL-SCNC: 140 MMOL/L (ref 136–145)
SP GR UR STRIP: 1.03 (ref 1–1.03)
SQUAMOUS #/AREA URNS HPF: ABNORMAL /HPF
UROBILINOGEN UR QL STRIP: ABNORMAL
WBC # UR STRIP: ABNORMAL /HPF
WBC NRBC COR # BLD: 7.6 10*3/MM3 (ref 3.4–10.8)
WHOLE BLOOD HOLD COAG: NORMAL
WHOLE BLOOD HOLD SPECIMEN: NORMAL

## 2023-08-18 PROCEDURE — 85025 COMPLETE CBC W/AUTO DIFF WBC: CPT | Performed by: EMERGENCY MEDICINE

## 2023-08-18 PROCEDURE — 87636 SARSCOV2 & INF A&B AMP PRB: CPT | Performed by: NURSE PRACTITIONER

## 2023-08-18 PROCEDURE — 71275 CT ANGIOGRAPHY CHEST: CPT

## 2023-08-18 PROCEDURE — 25510000001 IOPAMIDOL PER 1 ML: Performed by: EMERGENCY MEDICINE

## 2023-08-18 PROCEDURE — 80053 COMPREHEN METABOLIC PANEL: CPT | Performed by: EMERGENCY MEDICINE

## 2023-08-18 PROCEDURE — 83690 ASSAY OF LIPASE: CPT | Performed by: EMERGENCY MEDICINE

## 2023-08-18 PROCEDURE — 99285 EMERGENCY DEPT VISIT HI MDM: CPT

## 2023-08-18 PROCEDURE — 81001 URINALYSIS AUTO W/SCOPE: CPT | Performed by: EMERGENCY MEDICINE

## 2023-08-18 PROCEDURE — 81025 URINE PREGNANCY TEST: CPT | Performed by: EMERGENCY MEDICINE

## 2023-08-18 PROCEDURE — 74177 CT ABD & PELVIS W/CONTRAST: CPT

## 2023-08-18 RX ORDER — SODIUM CHLORIDE 9 MG/ML
10 INJECTION INTRAVENOUS AS NEEDED
Status: DISCONTINUED | OUTPATIENT
Start: 2023-08-18 | End: 2023-08-18 | Stop reason: HOSPADM

## 2023-08-18 RX ADMIN — SODIUM CHLORIDE 1000 ML: 9 INJECTION, SOLUTION INTRAVENOUS at 11:13

## 2023-08-18 RX ADMIN — IOPAMIDOL 90 ML: 755 INJECTION, SOLUTION INTRAVENOUS at 10:43

## 2023-08-18 NOTE — TELEPHONE ENCOUNTER
Caller: Hiedi Webb    Relationship to patient: Self    Best call back number: [phone] ([employer])     New or established patient?  [] New  [x] Established    Date of discharge: 23    Facility discharged from: [facility]    Diagnosis/Symptoms: KIDNEY PAIN, HURTING TO BREATH, EXCESSIVE PAIN IN RIGHT ABDOMEN    Length of stay (If applicable): HAD TO LEAVE BEFORE GETTING RESULTS.    Specialty Only: Did you see a [employer] health provider?    [x] Yes  [] No  If so, who? DR ANNEL VAZQUEZ AND NURSE AUBRIE STEARNS      HAD CT OF CHEST AND PELVIC 23    PATIENT ASKING FOR CALL BACK TO REVIEW RESULTS AND ADVISE NEXT STEP. STILL IN A LOT OF INTENSE PAIN IN RIGHT SIDE ABDOMEN, LEGS ARE ACHING, SHORT OF AIR FOR A COUPLE OF WEEKS AND HURTS TO BREATH.    ALSO ASKING ABOUT SPINE SCAN THAT WAS SUPPOSED TO BE DONE IN JULY AND ORDER HAS . PATIENT HAD FORGOTTEN ABOUT IT AND SAW IT ON MYCHART.      PLEASE CALL TO ADVISE THE RESULTS THAT WERE DONE TODAY AT [facility] IN [place].

## 2023-08-18 NOTE — ED PROVIDER NOTES
Subjective   History of Present Illness  Patient presents to the ER for lower abdominal pain that radiates into her bilateral flanks with pleuritic pain.  She tells me she has had some urinary symptoms as well.  She does have a uterine fibroid.  She denies any anterior chest pain.  Does report some nausea and diarrhea and a cough.  She denies any fevers or chills.      Review of Systems    Past Medical History:   Diagnosis Date    Anxiety     Asthma 2020    Use inhaler when needed    Cardiac arrhythmia     apparently    Depression 2020    Heart failure     Hypertension 202       Allergies   Allergen Reactions    Ciprofloxacin Shortness Of Breath    Sulfa Antibiotics Shortness Of Breath and Itching       Past Surgical History:   Procedure Laterality Date    TUBAL ABDOMINAL LIGATION  2012       Family History   Problem Relation Age of Onset    Diabetes Mother     Migraines Mother     Stroke Mother     Depression Father     Alcohol abuse Father     Depression Brother     Diabetes Other     Learning disabilities Other     ADD / ADHD Other     Cancer Other     Allergies Other     Migraines Other     Asthma Other     Other Other         CVA, blood disease    Seizures Other     Stroke Paternal Grandfather     Diabetes Paternal Grandmother     Stroke Maternal Uncle        Social History     Socioeconomic History    Marital status:    Tobacco Use    Smoking status: Every Day     Packs/day: 1.00     Years: 15.00     Pack years: 15.00     Types: Cigarettes     Start date: 1/1/1999    Smokeless tobacco: Never   Vaping Use    Vaping Use: Never used   Substance and Sexual Activity    Alcohol use: Never    Drug use: Not Currently     Types: Cocaine(coke), Marijuana, Methamphetamines    Sexual activity: Not Currently     Partners: Male     Birth control/protection: Condom, Same-sex partner           Objective   Physical Exam  Constitutional:       Appearance: She is well-developed.   HENT:      Head: Normocephalic and  atraumatic.      Right Ear: External ear normal.      Left Ear: External ear normal.      Nose: Nose normal.   Eyes:      Conjunctiva/sclera: Conjunctivae normal.      Pupils: Pupils are equal, round, and reactive to light.   Cardiovascular:      Rate and Rhythm: Normal rate and regular rhythm.      Heart sounds: Normal heart sounds.   Pulmonary:      Effort: Pulmonary effort is normal.      Breath sounds: Normal breath sounds.   Abdominal:      General: Bowel sounds are normal.      Palpations: Abdomen is soft.      Tenderness: There is abdominal tenderness.   Musculoskeletal:         General: Normal range of motion.      Cervical back: Normal range of motion and neck supple.   Skin:     General: Skin is warm and dry.   Neurological:      Mental Status: She is alert and oriented to person, place, and time.   Psychiatric:         Behavior: Behavior normal.         Thought Content: Thought content normal.         Judgment: Judgment normal.       Procedures           ED Course  ED Course as of 08/20/23 0713   Fri Aug 18, 2023   1121 Differential includes pulmonary embolism.  Pneumonia.  Uterine fibroid.  Colitis.  Appendicitis.  Urinary tract infection. [JM]      ED Course User Index  [JM] Anand Saldana APRN           CT Angiogram Chest   Final Result   Impression:   Negative exam for pulmonary embolism.      Findings suggest air trapping. No acute infiltrate.            Electronically Signed: Raissa Collins MD     8/18/2023 11:01 AM EDT     Workstation ID: [id number]      CT Abdomen Pelvis With Contrast   Final Result   Impression:   Fluid-filled colon which may reflect underlying diarrheal state. Otherwise no evidence of acute intra-abdominal abnormality.            Electronically Signed: Amari Alexander MD     8/18/2023 11:29 AM EDT     Workstation ID: [id number]                                        Medical Decision Making  Amount and/or Complexity of Data Reviewed  Labs: ordered.  Radiology:  ordered.    Risk  Prescription drug management.        Final diagnoses:   Acute abdominal pain   Acute flank pain       ED Disposition  ED Disposition       ED Disposition   AMA    Condition   --    Comment   --               No follow-up provider specified.       Medication List      No changes were made to your prescriptions during this visit.            Anand Saldana, MARIANGEL  08/20/23 0713

## 2023-08-18 NOTE — TELEPHONE ENCOUNTER
Caller: Heidi Webb    Relationship to patient: Self    Best call back number: [phone]    Chief complaint: HURTING TO BREATH IN RIB AREA, SHOOTING PAIN ACROSS PELVES.  STATED SHE THINKS ITS HER APPENDIX    Patient directed to call 911 or go to their nearest emergency room.     Patient verbalized understanding: [x] Yes  [] No  If no, why?    Additional notes:PATIENT STATED SHE IS AT [facility] WITH  HER MOTHER, PATIENT CALLED TO FOR RECOMMENDATION FROM PROVIDER AS WHAT TO DO.    DIRECTED PATIENT TO GO TO THE ER SINCE SHE IS AT [facility].

## 2023-08-21 ENCOUNTER — TELEPHONE (OUTPATIENT)
Dept: FAMILY MEDICINE CLINIC | Facility: CLINIC | Age: 38
End: 2023-08-21
Payer: MEDICAID

## 2023-08-21 NOTE — TELEPHONE ENCOUNTER
Caller: Heidi Webb    Relationship: Self    Best call back number: [phone]     Caller requesting test results: PATIENT    What test was performed: FRIDAY    When was the test performed: CT SCAN    Where was the test performed: [facility]

## 2023-08-23 NOTE — TELEPHONE ENCOUNTER
Let's get her on someone's schedule so we can take a look. If she's in severe pain she could go back to the ER. Can we request records? Thanks!

## 2023-08-24 ENCOUNTER — OFFICE VISIT (OUTPATIENT)
Dept: FAMILY MEDICINE CLINIC | Facility: CLINIC | Age: 38
End: 2023-08-24
Payer: MEDICAID

## 2023-08-24 ENCOUNTER — TELEPHONE (OUTPATIENT)
Dept: OBSTETRICS AND GYNECOLOGY | Facility: CLINIC | Age: 38
End: 2023-08-24

## 2023-08-24 VITALS
DIASTOLIC BLOOD PRESSURE: 90 MMHG | HEIGHT: 63 IN | SYSTOLIC BLOOD PRESSURE: 142 MMHG | BODY MASS INDEX: 33.31 KG/M2 | WEIGHT: 188 LBS | OXYGEN SATURATION: 98 % | HEART RATE: 89 BPM

## 2023-08-24 DIAGNOSIS — Z72.0 TOBACCO ABUSE: ICD-10-CM

## 2023-08-24 DIAGNOSIS — M25.511 CHRONIC RIGHT SHOULDER PAIN: Primary | ICD-10-CM

## 2023-08-24 DIAGNOSIS — R10.30 LOWER ABDOMINAL PAIN: ICD-10-CM

## 2023-08-24 DIAGNOSIS — J44.9 CHRONIC OBSTRUCTIVE PULMONARY DISEASE, UNSPECIFIED COPD TYPE: ICD-10-CM

## 2023-08-24 DIAGNOSIS — F41.1 GENERALIZED ANXIETY DISORDER: ICD-10-CM

## 2023-08-24 DIAGNOSIS — G89.29 CHRONIC RIGHT SHOULDER PAIN: Primary | ICD-10-CM

## 2023-08-24 PROCEDURE — 99214 OFFICE O/P EST MOD 30 MIN: CPT | Performed by: NURSE PRACTITIONER

## 2023-08-24 RX ORDER — FLUOXETINE HYDROCHLORIDE 20 MG/1
20 CAPSULE ORAL DAILY
Qty: 90 CAPSULE | Refills: 1 | Status: SHIPPED | OUTPATIENT
Start: 2023-08-24

## 2023-08-24 RX ORDER — NALOXONE HYDROCHLORIDE 4 MG/.1ML
SPRAY NASAL
COMMUNITY
Start: 2023-06-12

## 2023-08-24 RX ORDER — VARENICLINE TARTRATE 1 MG/1
1 TABLET, FILM COATED ORAL 2 TIMES DAILY
Qty: 56 TABLET | Refills: 1 | Status: SHIPPED | OUTPATIENT
Start: 2023-09-21 | End: 2023-11-16

## 2023-08-24 NOTE — PROGRESS NOTES
"Chief Complaint  Arm Pain (Right arm pain, got shot in January. ) and Abdominal Pain (/)    Subjective          Heidi Webb presents to [facility]  History of Present Illness  Pt is here to discuss multiple issues. She has had right arm/shoulder pain since getting her covid vaccine in January. She has had low abdominal pain for several weeks. She went to the ER and had a CT scan but left AMA before she got the results. She saw GYN a few months ago and she was diagnosed with uterine fibroids. She has had increased anxiety in the past few months. She has had shortness of breath, wheezing, coughing, and back pain for the past few weeks.   Arm Pain   Pertinent negatives include no chest pain.   Abdominal Pain  Associated symptoms include arthralgias. Pertinent negatives include no fever.     Objective   Vital Signs:   /90   Pulse 89   Ht 160 cm (63\")   Wt 85.3 kg (188 lb)   SpO2 98%   BMI 33.30 kg/mý     Body mass index is 33.3 kg/mý.    Review of Systems   Constitutional:  Negative for fatigue and fever.   Respiratory:  Positive for cough, shortness of breath and wheezing.    Cardiovascular:  Negative for chest pain, palpitations and leg swelling.   Gastrointestinal:  Positive for abdominal pain.   Musculoskeletal:  Positive for arthralgias.   Neurological:  Negative for syncope.   Psychiatric/Behavioral:  The patient is nervous/anxious.         Current Outpatient Medications:     albuterol sulfate  (90 Base) MCG/ACT inhaler, Inhale 2 puffs Every 4 (Four) Hours As Needed for Wheezing., Disp: 18 g, Rfl: 2    buprenorphine-naloxone (SUBOXONE) 8-2 MG per SL tablet, , Disp: , Rfl:     FLUoxetine (PROzac) 20 MG capsule, Take 1 capsule by mouth Daily., Disp: 90 capsule, Rfl: 1    gabapentin (Neurontin) 800 MG tablet, Take 1 tablet by mouth 4 (Four) Times a Day. Cancel the 600mg prescription please and the 90 quantity, Disp: 120 tablet, Rfl: 2    ibuprofen (ADVIL,MOTRIN) 800 " MG tablet, Take 1 tablet by mouth Every 8 (Eight) Hours As Needed for Moderate Pain., Disp: 90 tablet, Rfl: 5    Linzess 145 MCG capsule capsule, Take 1 capsule by mouth Every Morning Before Breakfast., Disp: 30 capsule, Rfl: 11    losartan (Cozaar) 25 MG tablet, Take 1 tablet by mouth Daily., Disp: 90 tablet, Rfl: 1    medroxyPROGESTERone (Provera) 10 MG tablet, Take 1 tablet by mouth Daily., Disp: 30 tablet, Rfl: 6    naloxone (NARCAN) 4 MG/0.1ML nasal spray, AS DIRECTED FOR OVERDOSE, THEN CALL 911, Disp: , Rfl:     nicotine (Nicoderm CQ) 21 MG/24HR patch, Place 1 patch on the skin as directed by provider Daily., Disp: 30 patch, Rfl: 1    Umeclidinium-Vilanterol (ANORO ELLIPTA) 62.5-25 MCG/ACT aerosol powder  inhaler, Inhale 1 puff Daily., Disp: 1 each, Rfl: 11    [START ON 9/21/2023] varenicline (Chantix Continuing Month Thaddeus) 1 MG tablet, Take 1 tablet by mouth 2 (Two) Times a Day for 56 days., Disp: 56 tablet, Rfl: 1    Varenicline Tartrate, Starter, 0.5 MG X 11 & 1 MG X 42 tablet therapy pack, Take 1 each by mouth 2 (Two) Times a Day for 27 days. Take 0.5 mg po daily x 3 days, then 0.5 mg po bid x 4 days, then 1 mg po bid, Disp: 53 each, Rfl: 0      Allergies: Ciprofloxacin and Sulfa antibiotics    Physical Exam  Constitutional:       Appearance: Normal appearance.   HENT:      Head: Normocephalic.   Eyes:      Conjunctiva/sclera: Conjunctivae normal.      Pupils: Pupils are equal, round, and reactive to light.   Cardiovascular:      Rate and Rhythm: Normal rate and regular rhythm.      Heart sounds: Normal heart sounds.   Pulmonary:      Effort: Pulmonary effort is normal.      Breath sounds: Normal breath sounds.   Abdominal:      Tenderness: There is no abdominal tenderness.   Musculoskeletal:         General: Normal range of motion.      Comments: Right shoulder tenderness   Skin:     General: Skin is warm and dry.      Capillary Refill: Capillary refill takes less than 2 seconds.   Neurological:       General: No focal deficit present.      Mental Status: She is alert and oriented to person, place, and time.   Psychiatric:         Mood and Affect: Mood normal.         Behavior: Behavior normal.         Thought Content: Thought content normal.         Judgment: Judgment normal.        Result Review :                   Assessment and Plan    Diagnoses and all orders for this visit:    1. Chronic right shoulder pain (Primary)  Comments:  F/U with ortho.  Orders:  -     Ambulatory Referral to Orthopedic Surgery    2. Lower abdominal pain  Comments:  F/U with GYN. Return or go to ER for worsened sx.    3. Generalized anxiety disorder  Comments:  Restart Prozac.  Orders:  -     FLUoxetine (PROzac) 20 MG capsule; Take 1 capsule by mouth Daily.  Dispense: 90 capsule; Refill: 1    4. Chronic obstructive pulmonary disease, unspecified COPD type  Comments:  Begin Anoro. I advised smoking cessation.  Orders:  -     Umeclidinium-Vilanterol (ANORO ELLIPTA) 62.5-25 MCG/ACT aerosol powder  inhaler; Inhale 1 puff Daily.  Dispense: 1 each; Refill: 11    5. Tobacco abuse  Comments:  Restart Chantix.  Orders:  -     Varenicline Tartrate, Starter, 0.5 MG X 11 & 1 MG X 42 tablet therapy pack; Take 1 each by mouth 2 (Two) Times a Day for 27 days. Take 0.5 mg po daily x 3 days, then 0.5 mg po bid x 4 days, then 1 mg po bid  Dispense: 53 each; Refill: 0  -     varenicline (Chantix Continuing Month Pak) 1 MG tablet; Take 1 tablet by mouth 2 (Two) Times a Day for 56 days.  Dispense: 56 tablet; Refill: 1                Follow Up   Return in about 1 month (around 9/24/2023) for Recheck.  Patient was given instructions and counseling regarding her condition or for health maintenance advice. Please see specific information pulled into the AVS if appropriate.     MARIANGEL Couch

## 2023-08-24 NOTE — TELEPHONE ENCOUNTER
Provider: DR. WRAY  Caller: JOE SANDHU  Relationship to Patient: SELF  Pharmacy:   Phone Number: [phone]  Reason for Call: PT WENT TO  ER ON 8-18 WITH SEVERE AB PAIN. NOTES IN CHART. SHE STATED SHE WAS TOLD TO CHECK W/HER GYN. SHE INDICATES THAT DR. WRAY FOUND R/[facility] A WHILE BACK. SHE IS STILL IN SEVERE PAIN ABOUT A 7 OUT OF 10 AND SOMETIMES HITS A 10 SHE IS WANTING TO GET SCHEDULED IN FOR VISIT. WILL PROBABLY NEED U/S . NA AT OFFICE CAN BE REACHED ANYTIME AND CAN LVM IF NA. AND CAN MSG HER IN MYCHART IF NEEDED.    When was the patient last seen: 06-07-23

## 2023-08-30 ENCOUNTER — TELEPHONE (OUTPATIENT)
Dept: FAMILY MEDICINE CLINIC | Facility: CLINIC | Age: 38
End: 2023-08-30
Payer: MEDICAID

## 2023-08-30 NOTE — TELEPHONE ENCOUNTER
Caller: Heidi Webb    Relationship: Self    Best call back number: [phone]    What medications are you currently taking:   Current Outpatient Medications on File Prior to Visit   Medication Sig Dispense Refill    albuterol sulfate  (90 Base) MCG/ACT inhaler Inhale 2 puffs Every 4 (Four) Hours As Needed for Wheezing. 18 g 2    buprenorphine-naloxone (SUBOXONE) 8-2 MG per SL tablet       FLUoxetine (PROzac) 20 MG capsule Take 1 capsule by mouth Daily. 90 capsule 1    gabapentin (Neurontin) 800 MG tablet Take 1 tablet by mouth 4 (Four) Times a Day. Cancel the 600mg prescription please and the 90 quantity 120 tablet 2    ibuprofen (ADVIL,MOTRIN) 800 MG tablet Take 1 tablet by mouth Every 8 (Eight) Hours As Needed for Moderate Pain. 90 tablet 5    Linzess 145 MCG capsule capsule Take 1 capsule by mouth Every Morning Before Breakfast. 30 capsule 11    losartan (Cozaar) 25 MG tablet Take 1 tablet by mouth Daily. 90 tablet 1    medroxyPROGESTERone (Provera) 10 MG tablet Take 1 tablet by mouth Daily. 30 tablet 6    naloxone (NARCAN) 4 MG/0.1ML nasal spray AS DIRECTED FOR OVERDOSE, THEN CALL 911      nicotine (Nicoderm CQ) 21 MG/24HR patch Place 1 patch on the skin as directed by provider Daily. 30 patch 1    Umeclidinium-Vilanterol (ANORO ELLIPTA) 62.5-25 MCG/ACT aerosol powder  inhaler Inhale 1 puff Daily. 1 each 11    [START ON 9/21/2023] varenicline (Chantix Continuing Month Thaddeus) 1 MG tablet Take 1 tablet by mouth 2 (Two) Times a Day for 56 days. 56 tablet 1    Varenicline Tartrate, Starter, 0.5 MG X 11 & 1 MG X 42 tablet therapy pack Take 1 each by mouth 2 (Two) Times a Day for 27 days. Take 0.5 mg po daily x 3 days, then 0.5 mg po bid x 4 days, then 1 mg po bid 53 each 0     No current facility-administered medications on file prior to visit.      Which medication are you concerned about: varenicline (Chantix Continuing Month Pak) 1 MG tablet     Who prescribed you this medication: JULIAN  KAYLA    What are your concerns: PATIENT STATS THAT ONE OF THE SIDE EFFECTS OF CHANTIX IS SUICIDE.  PATIENT ASKED IF THERE IS AN ALTERNATIVE

## 2023-08-31 RX ORDER — BUPROPION HYDROCHLORIDE 150 MG/1
150 TABLET ORAL DAILY
Qty: 90 TABLET | Refills: 0 | Status: SHIPPED | OUTPATIENT
Start: 2023-08-31

## 2023-09-07 ENCOUNTER — TELEPHONE (OUTPATIENT)
Dept: FAMILY MEDICINE CLINIC | Facility: CLINIC | Age: 38
End: 2023-09-07
Payer: MEDICAID

## 2023-09-07 DIAGNOSIS — M54.50 LUMBAR PAIN: ICD-10-CM

## 2023-09-07 DIAGNOSIS — G62.9 NEUROPATHY: ICD-10-CM

## 2023-09-07 DIAGNOSIS — F41.1 GENERALIZED ANXIETY DISORDER: ICD-10-CM

## 2023-09-07 DIAGNOSIS — M25.511 CHRONIC RIGHT SHOULDER PAIN: Primary | ICD-10-CM

## 2023-09-07 DIAGNOSIS — J45.40 MODERATE PERSISTENT ASTHMA, UNSPECIFIED WHETHER COMPLICATED: ICD-10-CM

## 2023-09-07 DIAGNOSIS — G89.29 CHRONIC RIGHT SHOULDER PAIN: Primary | ICD-10-CM

## 2023-09-07 NOTE — TELEPHONE ENCOUNTER
Caller: Heidi Webb    Relationship: Self    Best call back number: [phone]     Requested Prescriptions:   Requested Prescriptions     Pending Prescriptions Disp Refills    gabapentin (Neurontin) 800 MG tablet 120 tablet 2     Sig: Take 1 tablet by mouth 4 (Four) Times a Day. Cancel the 600mg prescription please and the 90 quantity    albuterol sulfate  (90 Base) MCG/ACT inhaler 18 g 2     Sig: Inhale 2 puffs Every 4 (Four) Hours As Needed for Wheezing.    FLUoxetine (PROzac) 20 MG capsule 90 capsule 1     Sig: Take 1 capsule by mouth Daily.        Pharmacy where request should be sent: [id number] - [place], [place] - [address] [place] [address] AT [place] [address] - [phone] [facility] [phone] FX     Last office visit with prescribing clinician: 8/24/2023   Last telemedicine visit with prescribing clinician: 7/13/2023   Next office visit with prescribing clinician: 9/20/2023     Additional details provided by patient: PLEASE REFILL,     Does the patient have less than a 3 day supply:  [] Yes  [x] No    Would you like a call back once the refill request has been completed: [] Yes [x] No    If the office needs to give you a call back, can they leave a voicemail: [] Yes [x] No    Loraine Sears   09/07/23 15:51 EDT

## 2023-09-07 NOTE — TELEPHONE ENCOUNTER
Caller: Heidi Webb    Relationship: Self    Best call back number: [phone]     What is the medical concern/diagnosis: SHOULDER PAIN     What specialty or service is being requested: ORTHOPEDIC        Any additional details: WAS REFERRED TO ONE IN Supply AND CAN'T GET THAT FAR. NEEDS ONE CLOSER MAYBE MARY

## 2023-09-08 RX ORDER — FLUOXETINE HYDROCHLORIDE 20 MG/1
20 CAPSULE ORAL DAILY
Qty: 90 CAPSULE | Refills: 1 | Status: SHIPPED | OUTPATIENT
Start: 2023-09-08

## 2023-09-08 RX ORDER — GABAPENTIN 800 MG/1
800 TABLET ORAL 4 TIMES DAILY
Qty: 120 TABLET | Refills: 1 | Status: SHIPPED | OUTPATIENT
Start: 2023-09-08

## 2023-09-08 RX ORDER — ALBUTEROL SULFATE 90 UG/1
2 AEROSOL, METERED RESPIRATORY (INHALATION) EVERY 4 HOURS PRN
Qty: 18 G | Refills: 2 | Status: SHIPPED | OUTPATIENT
Start: 2023-09-08

## 2023-09-18 ENCOUNTER — TELEPHONE (OUTPATIENT)
Dept: OBSTETRICS AND GYNECOLOGY | Facility: CLINIC | Age: 38
End: 2023-09-18
Payer: MEDICAID

## 2023-09-18 NOTE — TELEPHONE ENCOUNTER
Patient states she has a hx of fibroids. She reports daily pelvic pain and severe abdominal bloating that has been present for several months. She is requesting to be seen by Dr Carrera. Advised pt that we could schedule her to see Dr Carrera but if an US is needed then Dr Carrera would have to add it on that day. Pt VU. Pts last US on 5/11/23 revealed a small fibroid on the right side.Pt scheduled with Dr Carrera for Thurs afternoon. Pt unable to come in any earlier on Thurs due to her mother having a chemo treatment.

## 2023-09-21 ENCOUNTER — OFFICE VISIT (OUTPATIENT)
Dept: OBSTETRICS AND GYNECOLOGY | Facility: CLINIC | Age: 38
End: 2023-09-21
Payer: MEDICAID

## 2023-09-21 VITALS
BODY MASS INDEX: 31.36 KG/M2 | SYSTOLIC BLOOD PRESSURE: 150 MMHG | DIASTOLIC BLOOD PRESSURE: 90 MMHG | HEIGHT: 63 IN | WEIGHT: 177 LBS

## 2023-09-21 DIAGNOSIS — N93.9 ABNORMAL UTERINE BLEEDING (AUB): Primary | ICD-10-CM

## 2023-09-21 DIAGNOSIS — R63.5 ABNORMAL WEIGHT GAIN: ICD-10-CM

## 2023-09-21 DIAGNOSIS — G89.29 CHRONIC BILATERAL LOW BACK PAIN WITH BILATERAL SCIATICA: ICD-10-CM

## 2023-09-21 DIAGNOSIS — M54.41 CHRONIC BILATERAL LOW BACK PAIN WITH BILATERAL SCIATICA: ICD-10-CM

## 2023-09-21 DIAGNOSIS — R10.30 LOWER ABDOMINAL PAIN: ICD-10-CM

## 2023-09-21 DIAGNOSIS — D25.1 INTRAMURAL LEIOMYOMA OF UTERUS: ICD-10-CM

## 2023-09-21 DIAGNOSIS — M54.42 CHRONIC BILATERAL LOW BACK PAIN WITH BILATERAL SCIATICA: ICD-10-CM

## 2023-09-21 DIAGNOSIS — F19.11 HX OF SUBSTANCE ABUSE: ICD-10-CM

## 2023-09-21 DIAGNOSIS — Z12.4 SCREENING FOR CERVICAL CANCER: ICD-10-CM

## 2023-09-21 RX ORDER — DROSPIRENONE 4 MG/1
1 TABLET, FILM COATED ORAL DAILY
Qty: 90 TABLET | Refills: 3 | Status: SHIPPED | OUTPATIENT
Start: 2023-09-21 | End: 2024-09-20

## 2023-09-21 NOTE — PROGRESS NOTES
Chief Complaint   Patient presents with    Gynecologic Exam       Subjective   HPI  Heidi Webb is a 38 y.o. female, . Her last LMP was Patient's last menstrual period was 09/15/2023.. who presents for follow up on pelvic pain , bloating.      At her last visit she was treated with Provera. Since then she reports her symptoms/issue has worsened . The patient reports additional symptoms as bloating and pelvic pain.        Additional OB/GYN History     Last Pap : 2023  Last Completed Pap Smear            PAP SMEAR (Every 3 Years) Next due on 2023  LIQUID-BASED PAP SMEAR WITH HPV GENOTYPING REGARDLESS OF INTERPRETATION ([facility])                    Last mammogram: na  Last Completed Mammogram       This patient has no relevant Health Maintenance data.            Tobacco Usage?: No   OB History          8    Para   7    Term   7       0    AB   1    Living   7         SAB   1    IAB   0    Ectopic   0    Molar   0    Multiple   0    Live Births   7                  Current Outpatient Medications:     albuterol sulfate  (90 Base) MCG/ACT inhaler, Inhale 2 puffs Every 4 (Four) Hours As Needed for Wheezing., Disp: 18 g, Rfl: 2    buprenorphine-naloxone (SUBOXONE) 8-2 MG per SL tablet, , Disp: , Rfl:     buPROPion XL (Wellbutrin XL) 150 MG 24 hr tablet, Take 1 tablet by mouth Daily., Disp: 90 tablet, Rfl: 0    FLUoxetine (PROzac) 20 MG capsule, Take 1 capsule by mouth Daily., Disp: 90 capsule, Rfl: 1    gabapentin (Neurontin) 800 MG tablet, Take 1 tablet by mouth 4 (Four) Times a Day. Cancel the 600mg prescription please and the 90 quantity, Disp: 120 tablet, Rfl: 1    ibuprofen (ADVIL,MOTRIN) 800 MG tablet, Take 1 tablet by mouth Every 8 (Eight) Hours As Needed for Moderate Pain., Disp: 90 tablet, Rfl: 5    Linzess 145 MCG capsule capsule, Take 1 capsule by mouth Every Morning Before Breakfast., Disp: 30 capsule, Rfl: 11    losartan (Cozaar) 25 MG  "tablet, Take 1 tablet by mouth Daily., Disp: 90 tablet, Rfl: 1    naloxone (NARCAN) 4 MG/0.1ML nasal spray, AS DIRECTED FOR OVERDOSE, THEN CALL 911, Disp: , Rfl:     nicotine (Nicoderm CQ) 21 MG/24HR patch, Place 1 patch on the skin as directed by provider Daily., Disp: 30 patch, Rfl: 1    Umeclidinium-Vilanterol (ANORO ELLIPTA) 62.5-25 MCG/ACT aerosol powder  inhaler, Inhale 1 puff Daily., Disp: 1 each, Rfl: 11    Drospirenone (Slynd) 4 MG tablet, Take 1 tablet by mouth Daily., Disp: 90 tablet, Rfl: 3     Past Medical History:   Diagnosis Date    Anxiety     Asthma 2020    Use inhaler when needed    Cardiac arrhythmia     apparently    Depression 2020    Heart failure     Hypertension 202        Past Surgical History:   Procedure Laterality Date    TUBAL ABDOMINAL LIGATION  2012       The additional following portions of the patient's history were reviewed and updated as appropriate: allergies and current medications.    Review of Systems   Constitutional:  Negative for chills, fever and unexpected weight loss.   Respiratory: Negative.     Cardiovascular: Negative.    Gastrointestinal:  Positive for abdominal distention and abdominal pain.   Genitourinary:  Positive for menstrual problem. Negative for breast lump, breast pain, dysuria, hematuria and vaginal discharge.   Musculoskeletal:  Positive for back pain.   Psychiatric/Behavioral:  Negative for dysphoric mood.      I have reviewed and agree with the HPI, ROS, and historical information as entered above.   Anand Carrera MD      Objective   /90   Ht 160 cm (63\")   Wt 80.3 kg (177 lb)   LMP 09/15/2023   BMI 31.35 kg/m²     Physical Exam  Vitals and nursing note reviewed. Exam conducted with a chaperone present.   Constitutional:       Appearance: She is obese.   HENT:      Head: Normocephalic and atraumatic.   Pulmonary:      Effort: Pulmonary effort is normal.   Abdominal:      General: Abdomen is flat.      Palpations: Abdomen is soft. There is " no mass.      Tenderness: There is no guarding.   Genitourinary:     General: Normal vulva.      Exam position: Lithotomy position.      Labia:         Right: No rash, tenderness or lesion.         Left: No rash, tenderness or lesion.       Urethra: No urethral pain, urethral swelling or urethral lesion.      Vagina: Normal. No vaginal discharge, tenderness, bleeding or lesions.      Cervix: No cervical motion tenderness, discharge, lesion or cervical bleeding.      Uterus: Normal. Not enlarged, not fixed and not tender.       Adnexa:         Right: No mass, tenderness or fullness.          Left: No mass, tenderness or fullness.        Rectum: No external hemorrhoid.      Comments: Chaperone Present; no vaginal discharge.  Cervix without lesions or discharge or bleeding; Uterus mid position mobile upper normal size; slightly tender to manipulation.  No adnexal masses.  Neurological:      Mental Status: She is alert.       Assessment & Plan     Assessment     Problem List Items Addressed This Visit       Screening for cervical cancer    Overview     Pap smear 2012 in [place].  H/o Abn pap 2006; resolved without treatment.   Pap smear 5/11/2023 with ASCUS.  HPV high risk Pool negative.  Repeat in 3 years.    GC / chlamydia screen was negative         Abnormal uterine bleeding (AUB) - Primary    Overview     Used to have regular menses  Onset AUB Feb 2023;  Ultrasound was negative except for a 1.5 cm intramural fibroid.  Endometrial measurement was 4 mm.  Can try Provera 10 mg for 10 days to see if bleeding improves.     9/21/2023; patient took Provera 10 mg in a continuous fashion for several months instead of cyclically.  Continued to have unpredictable periods while taking Provera despite lab work showing menopausal levels of estradiol and FSH.  Ran out and stopped Provera around 8/15/2023.  Can try Slynd to see if will regulate or suppress menses better.           Relevant Orders    US Non-ob Transvaginal     Abnormal weight gain    Overview     Weight gain from 158 to 172 pounds over 3 months         Lower abdominal pain    Overview     Bilateral lower abdominal pain.  Ultrasound 5/11/2023 was negative except for small fibroid.  CT scan of the abdomen pelvis 8/18/2023 was significant for Degenerative changes of the spine most advanced at L4-5 and L5-S1 where there are posterior disc bulges.     Most likely has referred abdominal pain from bilateral DDD.  Needs MRI of lumbar spine         Relevant Orders    MRI Lumbar Spine With & Without Contrast    Intramural leiomyoma of uterus    Overview     1.5 cm right IM fibroid.          Relevant Orders    US Non-ob Transvaginal    Hx of substance abuse    Bilateral low back pain with bilateral sciatica    Relevant Orders    MRI Lumbar Spine With & Without Contrast       Irregular menses.  Patient was taking Provera 10 mg daily and continued to have irregular menses despite FSH and estradiol being in the menopausal range.  Patient ran out of Provera August 15 and bleeding stopped after couple weeks.  She is currently not bleeding.  Can try Slynd is to try to regulate menses.  Samples given x2.  Bilateral abdominal pain is likely referred pain from degenerative disc disease.  CT scan of the pelvis on 8/18/2023 throat showed Degenerative changes of the spine most advanced at L4-5 and L5-S1 where there are posterior disc bulges.  Needs MRI of the lumbar spine for further evaluation.  Bilateral low back pain with bilateral sciatica.  Small 1.5 cm intramural fibroid not likely to be contributing to bleeding or pain.  Can repeat ultrasound when returns    Plan     Try Slynd to try to regulate menses.  MRI of spine to evaluate degenerative disc disease.  May benefit from physical therapy or referral to neurosurgery.  Consider repeating FSH and estradiol after several months to see if remains menopausal.  Return in about 8 weeks (around 11/16/2023) for Follow-up ultrasound.        Anand  Felix Carrera MD  09/21/2023

## 2023-09-28 ENCOUNTER — OFFICE VISIT (OUTPATIENT)
Dept: FAMILY MEDICINE CLINIC | Facility: CLINIC | Age: 38
End: 2023-09-28
Payer: MEDICAID

## 2023-09-28 VITALS
DIASTOLIC BLOOD PRESSURE: 100 MMHG | OXYGEN SATURATION: 96 % | WEIGHT: 179 LBS | HEART RATE: 89 BPM | HEIGHT: 63 IN | BODY MASS INDEX: 31.71 KG/M2 | SYSTOLIC BLOOD PRESSURE: 144 MMHG

## 2023-09-28 DIAGNOSIS — I10 PRIMARY HYPERTENSION: ICD-10-CM

## 2023-09-28 DIAGNOSIS — G62.9 NEUROPATHY: Primary | ICD-10-CM

## 2023-09-28 DIAGNOSIS — R73.9 HYPERGLYCEMIA: ICD-10-CM

## 2023-09-28 DIAGNOSIS — R30.0 DYSURIA: ICD-10-CM

## 2023-09-28 DIAGNOSIS — R53.83 OTHER FATIGUE: ICD-10-CM

## 2023-09-28 DIAGNOSIS — Z79.899 HIGH RISK MEDICATION USE: ICD-10-CM

## 2023-09-28 DIAGNOSIS — R06.02 SHORTNESS OF BREATH: ICD-10-CM

## 2023-09-28 DIAGNOSIS — Z72.0 TOBACCO ABUSE: ICD-10-CM

## 2023-09-28 DIAGNOSIS — F41.1 GENERALIZED ANXIETY DISORDER: ICD-10-CM

## 2023-09-28 DIAGNOSIS — M54.50 LUMBAR PAIN: ICD-10-CM

## 2023-09-28 LAB
AMPHET+METHAMPHET UR QL: NEGATIVE
AMPHETAMINE INTERNAL CONTROL: ABNORMAL
AMPHETAMINES UR QL: NEGATIVE
BARBITURATE INTERNAL CONTROL: ABNORMAL
BARBITURATES UR QL SCN: NEGATIVE
BENZODIAZ UR QL SCN: NEGATIVE
BENZODIAZEPINE INTERNAL CONTROL: ABNORMAL
BILIRUB BLD-MCNC: NEGATIVE MG/DL
BUPRENORPHINE INTERNAL CONTROL: ABNORMAL
BUPRENORPHINE SERPL-MCNC: POSITIVE NG/ML
CANNABINOIDS SERPL QL: NEGATIVE
CLARITY, POC: CLEAR
COCAINE INTERNAL CONTROL: ABNORMAL
COCAINE UR QL: NEGATIVE
COLOR UR: YELLOW
EXPIRATION DATE: ABNORMAL
EXPIRATION DATE: NORMAL
GLUCOSE UR STRIP-MCNC: NEGATIVE MG/DL
KETONES UR QL: NEGATIVE
LEUKOCYTE EST, POC: NEGATIVE
Lab: ABNORMAL
Lab: NORMAL
MDMA (ECSTASY) INTERNAL CONTROL: ABNORMAL
MDMA UR QL SCN: NEGATIVE
METHADONE INTERNAL CONTROL: ABNORMAL
METHADONE UR QL SCN: NEGATIVE
METHAMPHETAMINE INTERNAL CONTROL: ABNORMAL
NITRITE UR-MCNC: NEGATIVE MG/ML
OPIATES INTERNAL CONTROL: ABNORMAL
OPIATES UR QL: NEGATIVE
OXYCODONE INTERNAL CONTROL: ABNORMAL
OXYCODONE UR QL SCN: NEGATIVE
PCP UR QL SCN: NEGATIVE
PH UR: 6 [PH] (ref 5–8)
PHENCYCLIDINE INTERNAL CONTROL: ABNORMAL
PROT UR STRIP-MCNC: NEGATIVE MG/DL
RBC # UR STRIP: NEGATIVE /UL
SP GR UR: 1.03 (ref 1–1.03)
THC INTERNAL CONTROL: ABNORMAL
UROBILINOGEN UR QL: NORMAL

## 2023-09-28 RX ORDER — LOSARTAN POTASSIUM 50 MG/1
50 TABLET ORAL DAILY
Qty: 90 TABLET | Refills: 1 | Status: SHIPPED | OUTPATIENT
Start: 2023-09-28

## 2023-09-28 RX ORDER — GABAPENTIN 800 MG/1
800 TABLET ORAL 4 TIMES DAILY
Qty: 120 TABLET | Refills: 2 | Status: SHIPPED | OUTPATIENT
Start: 2023-09-28

## 2023-09-28 RX ORDER — HYDROXYZINE HYDROCHLORIDE 25 MG/1
25 TABLET, FILM COATED ORAL 3 TIMES DAILY PRN
Qty: 30 TABLET | Refills: 2 | Status: SHIPPED | OUTPATIENT
Start: 2023-09-28

## 2023-09-28 NOTE — PROGRESS NOTES
"Chief Complaint  controlled substance refill and Depression    Subjective          Heidi Webb presents to [facility]  History of Present Illness  Pt is here to follow up on multiple issues. She has gained weight recently. She has had ongoing shortness of breath. Her BP has been elevated. She has dysuria. She has increasing anxiety. She tried Wellbutrin for smoking cessation but does not feel this worked.   DepressionPatient presents with the following symptoms: nervousness/anxiety and shortness of breath.  Patient is not experiencing: palpitations.        Objective   Vital Signs:   /100   Pulse 89   Ht 160 cm (63\")   Wt 81.2 kg (179 lb)   SpO2 96%   BMI 31.71 kg/m²     Body mass index is 31.71 kg/m².    Review of Systems   Constitutional:  Negative for fatigue and fever.   Respiratory:  Positive for shortness of breath.    Cardiovascular:  Negative for chest pain, palpitations and leg swelling.   Neurological:  Negative for syncope.   Psychiatric/Behavioral:  The patient is nervous/anxious.         Current Outpatient Medications:     albuterol sulfate  (90 Base) MCG/ACT inhaler, Inhale 2 puffs Every 4 (Four) Hours As Needed for Wheezing., Disp: 18 g, Rfl: 2    buprenorphine-naloxone (SUBOXONE) 8-2 MG per SL tablet, , Disp: , Rfl:     buPROPion XL (Wellbutrin XL) 150 MG 24 hr tablet, Take 1 tablet by mouth Daily., Disp: 90 tablet, Rfl: 0    FLUoxetine (PROzac) 20 MG capsule, Take 1 capsule by mouth Daily., Disp: 90 capsule, Rfl: 1    gabapentin (Neurontin) 800 MG tablet, Take 1 tablet by mouth 4 (Four) Times a Day. Cancel the 600mg prescription please and the 90 quantity, Disp: 120 tablet, Rfl: 2    ibuprofen (ADVIL,MOTRIN) 800 MG tablet, Take 1 tablet by mouth Every 8 (Eight) Hours As Needed for Moderate Pain., Disp: 90 tablet, Rfl: 5    Linzess 145 MCG capsule capsule, Take 1 capsule by mouth Every Morning Before Breakfast., Disp: 30 capsule, Rfl: 11    losartan " (Cozaar) 50 MG tablet, Take 1 tablet by mouth Daily., Disp: 90 tablet, Rfl: 1    naloxone (NARCAN) 4 MG/0.1ML nasal spray, AS DIRECTED FOR OVERDOSE, THEN CALL 911, Disp: , Rfl:     nicotine (Nicoderm CQ) 21 MG/24HR patch, Place 1 patch on the skin as directed by provider Daily., Disp: 30 patch, Rfl: 1    Umeclidinium-Vilanterol (ANORO ELLIPTA) 62.5-25 MCG/ACT aerosol powder  inhaler, Inhale 1 puff Daily., Disp: 1 each, Rfl: 11    hydrOXYzine (ATARAX) 25 MG tablet, Take 1 tablet by mouth 3 (Three) Times a Day As Needed for Anxiety., Disp: 30 tablet, Rfl: 2    nicotine polacrilex (Nicorette) 4 MG gum, Chew 1 each As Needed for Smoking Cessation., Disp: 100 each, Rfl: 2      Allergies: Ciprofloxacin and Sulfa antibiotics    Physical Exam  Constitutional:       Appearance: Normal appearance.   HENT:      Head: Normocephalic.   Eyes:      Conjunctiva/sclera: Conjunctivae normal.      Pupils: Pupils are equal, round, and reactive to light.   Cardiovascular:      Rate and Rhythm: Normal rate and regular rhythm.      Heart sounds: Normal heart sounds.   Pulmonary:      Effort: Pulmonary effort is normal.      Breath sounds: Normal breath sounds.   Abdominal:      Tenderness: There is no abdominal tenderness.   Musculoskeletal:         General: Normal range of motion.   Skin:     General: Skin is warm and dry.      Capillary Refill: Capillary refill takes less than 2 seconds.   Neurological:      General: No focal deficit present.      Mental Status: She is alert and oriented to person, place, and time.   Psychiatric:         Mood and Affect: Mood normal.         Behavior: Behavior normal.         Thought Content: Thought content normal.         Judgment: Judgment normal.        Result Review :                   Assessment and Plan    Diagnoses and all orders for this visit:    1. Neuropathy (Primary)  Comments:  Continue Gabapentin.  Orders:  -     POC Urine Drug Screen [facility]  -     gabapentin (Neurontin) 800 MG  tablet; Take 1 tablet by mouth 4 (Four) Times a Day. Cancel the 600mg prescription please and the 90 quantity  Dispense: 120 tablet; Refill: 2    2. High risk medication use  -     POC Urine Drug Screen [facility]    3. Lumbar pain  Comments:  Continue Gabapentin.  Orders:  -     gabapentin (Neurontin) 800 MG tablet; Take 1 tablet by mouth 4 (Four) Times a Day. Cancel the 600mg prescription please and the 90 quantity  Dispense: 120 tablet; Refill: 2    4. Shortness of breath  Comments:  CXR done. F/U with pulmonology.  Orders:  -     Ambulatory Referral to Pulmonology  -     XR Chest PA & Lateral; Future    5. Other fatigue  Comments:  Labs drawn.  Orders:  -     CBC & Differential  -     Comprehensive Metabolic Panel  -     TSH    6. Hyperglycemia  -     Hemoglobin A1c    7. Primary hypertension  Comments:  Increase Losartan. Monitor BP and keep record. F/U in 1 month.  Orders:  -     losartan (Cozaar) 50 MG tablet; Take 1 tablet by mouth Daily.  Dispense: 90 tablet; Refill: 1    8. Generalized anxiety disorder  Comments:  Continue Prozac and add Hydroxyzine PRN acute anxiety.  Orders:  -     hydrOXYzine (ATARAX) 25 MG tablet; Take 1 tablet by mouth 3 (Three) Times a Day As Needed for Anxiety.  Dispense: 30 tablet; Refill: 2    9. Tobacco abuse  Comments:  Nicoderm and Nicorette gum.  Orders:  -     nicotine polacrilex (Nicorette) 4 MG gum; Chew 1 each As Needed for Smoking Cessation.  Dispense: 100 each; Refill: 2    10. Primary hypertension  Comments:  Raise dose of Losartan.  Orders:  -     losartan (Cozaar) 50 MG tablet; Take 1 tablet by mouth Daily.  Dispense: 90 tablet; Refill: 1    11. Dysuria  Comments:  UA and cx done.  Orders:  -     Urine Culture - Urine, Urine, Clean Catch  -     POC Urinalysis Dipstick, Automated                Follow Up   Return in about 1 month (around 10/28/2023) for Recheck.  Patient was given instructions and counseling regarding her condition or for health maintenance  advice. Please see specific information pulled into the AVS if appropriate.     MARIANGEL Couch

## 2023-09-29 LAB
ALBUMIN SERPL-MCNC: 4.3 G/DL (ref 3.9–4.9)
ALBUMIN/GLOB SERPL: 1.5 {RATIO} (ref 1.2–2.2)
ALP SERPL-CCNC: 71 IU/L (ref 44–121)
ALT SERPL-CCNC: 9 IU/L (ref 0–32)
AST SERPL-CCNC: 12 IU/L (ref 0–40)
BASOPHILS # BLD AUTO: 0.1 X10E3/UL (ref 0–0.2)
BASOPHILS NFR BLD AUTO: 1 %
BILIRUB SERPL-MCNC: 0.2 MG/DL (ref 0–1.2)
BUN SERPL-MCNC: 12 MG/DL (ref 6–20)
BUN/CREAT SERPL: 21 (ref 9–23)
CALCIUM SERPL-MCNC: 9 MG/DL (ref 8.7–10.2)
CHLORIDE SERPL-SCNC: 101 MMOL/L (ref 96–106)
CO2 SERPL-SCNC: 21 MMOL/L (ref 20–29)
CREAT SERPL-MCNC: 0.58 MG/DL (ref 0.57–1)
EGFRCR SERPLBLD CKD-EPI 2021: 118 ML/MIN/1.73
EOSINOPHIL # BLD AUTO: 0.6 X10E3/UL (ref 0–0.4)
EOSINOPHIL NFR BLD AUTO: 6 %
ERYTHROCYTE [DISTWIDTH] IN BLOOD BY AUTOMATED COUNT: 13.1 % (ref 11.7–15.4)
GLOBULIN SER CALC-MCNC: 2.8 G/DL (ref 1.5–4.5)
GLUCOSE SERPL-MCNC: NORMAL MG/DL
HBA1C MFR BLD: 5.9 % (ref 4.8–5.6)
HCT VFR BLD AUTO: 35.9 % (ref 34–46.6)
HGB BLD-MCNC: 11.7 G/DL (ref 11.1–15.9)
IMM GRANULOCYTES # BLD AUTO: 0 X10E3/UL (ref 0–0.1)
IMM GRANULOCYTES NFR BLD AUTO: 0 %
LYMPHOCYTES # BLD AUTO: 3.2 X10E3/UL (ref 0.7–3.1)
LYMPHOCYTES NFR BLD AUTO: 29 %
MCH RBC QN AUTO: 28.1 PG (ref 26.6–33)
MCHC RBC AUTO-ENTMCNC: 32.6 G/DL (ref 31.5–35.7)
MCV RBC AUTO: 86 FL (ref 79–97)
MONOCYTES # BLD AUTO: 0.5 X10E3/UL (ref 0.1–0.9)
MONOCYTES NFR BLD AUTO: 4 %
NEUTROPHILS # BLD AUTO: 6.6 X10E3/UL (ref 1.4–7)
NEUTROPHILS NFR BLD AUTO: 60 %
PLATELET # BLD AUTO: 439 X10E3/UL (ref 150–450)
POTASSIUM SERPL-SCNC: NORMAL MMOL/L
PROT SERPL-MCNC: 7.1 G/DL (ref 6–8.5)
RBC # BLD AUTO: 4.17 X10E6/UL (ref 3.77–5.28)
SODIUM SERPL-SCNC: 138 MMOL/L (ref 134–144)
TSH SERPL DL<=0.005 MIU/L-ACNC: 1.54 UIU/ML (ref 0.45–4.5)
WBC # BLD AUTO: 11.1 X10E3/UL (ref 3.4–10.8)

## 2023-09-30 LAB
BACTERIA UR CULT: NO GROWTH
BACTERIA UR CULT: NORMAL

## 2023-10-02 NOTE — PROGRESS NOTES
Your infection cells were slightly elevated. Your A1C showed pre-diabetes. Try to watch your diet more closely for sugars and carbohydrates. Your urine culture did not show infection. Everything else looked good. Take care!

## 2023-10-05 ENCOUNTER — TELEPHONE (OUTPATIENT)
Dept: FAMILY MEDICINE CLINIC | Facility: CLINIC | Age: 38
End: 2023-10-05
Payer: MEDICAID

## 2023-10-05 NOTE — TELEPHONE ENCOUNTER
Your XR looked fine. I hope you are feeling better soon!       Your infection cells were slightly elevated. Your A1C showed pre-diabetes. Try to watch your diet more closely for sugars and carbohydrates. Your urine culture did not show infection. Everything else looked good. Take care!

## 2023-10-05 NOTE — TELEPHONE ENCOUNTER
Caller: Heidi Webb    Relationship: Self    Best call back number: [phone]     Caller requesting test results: XRAYS AND LABS     What test was performed: LABS AND XRAY     When was the test performed: 9.28    Where was the test performed: OFFICE     Additional notes: PLEASE CALL WITH RESULTS AND IF THERE ARE ANY MEDICATIONS NEEDED

## 2023-10-10 ENCOUNTER — OFFICE VISIT (OUTPATIENT)
Dept: ORTHOPEDIC SURGERY | Facility: CLINIC | Age: 38
End: 2023-10-10
Payer: MEDICAID

## 2023-10-10 VITALS
WEIGHT: 179 LBS | BODY MASS INDEX: 31.71 KG/M2 | HEIGHT: 63 IN | DIASTOLIC BLOOD PRESSURE: 98 MMHG | SYSTOLIC BLOOD PRESSURE: 142 MMHG

## 2023-10-10 DIAGNOSIS — M54.12 CERVICAL RADICULOPATHY: ICD-10-CM

## 2023-10-10 DIAGNOSIS — M75.81 RIGHT ROTATOR CUFF TENDINITIS: Primary | ICD-10-CM

## 2023-10-10 RX ORDER — LIDOCAINE HYDROCHLORIDE 20 MG/ML
4 INJECTION, SOLUTION INFILTRATION; PERINEURAL
Status: COMPLETED | OUTPATIENT
Start: 2023-10-10 | End: 2023-10-10

## 2023-10-10 RX ORDER — TRIAMCINOLONE ACETONIDE 40 MG/ML
2 INJECTION, SUSPENSION INTRA-ARTICULAR; INTRAMUSCULAR
Status: COMPLETED | OUTPATIENT
Start: 2023-10-10 | End: 2023-10-10

## 2023-10-10 RX ORDER — BUPIVACAINE HYDROCHLORIDE 2.5 MG/ML
4 INJECTION, SOLUTION EPIDURAL; INFILTRATION; INTRACAUDAL
Status: COMPLETED | OUTPATIENT
Start: 2023-10-10 | End: 2023-10-10

## 2023-10-10 RX ADMIN — LIDOCAINE HYDROCHLORIDE 4 ML: 20 INJECTION, SOLUTION INFILTRATION; PERINEURAL at 14:13

## 2023-10-10 RX ADMIN — TRIAMCINOLONE ACETONIDE 2 ML: 40 INJECTION, SUSPENSION INTRA-ARTICULAR; INTRAMUSCULAR at 14:13

## 2023-10-10 RX ADMIN — BUPIVACAINE HYDROCHLORIDE 4 ML: 2.5 INJECTION, SOLUTION EPIDURAL; INFILTRATION; INTRACAUDAL at 14:13

## 2023-10-10 NOTE — PROGRESS NOTES
Procedure   - Large Joint Arthrocentesis: R subacromial bursa on 10/10/2023 2:13 PM  Indications: pain  Details: 21 G needle, posterior approach  Medications: 4 mL bupivacaine (PF) 0.25 %; 4 mL lidocaine 2%; 2 mL triamcinolone acetonide 40 MG/ML  Outcome: tolerated well, no immediate complications  Procedure, treatment alternatives, risks and benefits explained, specific risks discussed. Consent was given by the patient. Immediately prior to procedure a time out was called to verify the correct patient, procedure, equipment, support staff and site/side marked as required. Patient was prepped and draped in the usual sterile fashion.

## 2023-10-10 NOTE — PROGRESS NOTES
[facility] Office Visit     Office Visit       Date: 10/10/2023   Patient Name: Heidi Webb  MRN: [id number]  YOB: 1985    Referring Physician: Anisa Conteh APRN     Chief Complaint:   Chief Complaint   Patient presents with    Right Shoulder - Pain     History of Present Illness:   Heidi Webb is a 38 y.o. female who presents with new problem of: right shoulder pain.  Onset: atraumatic and gradual in nature. The issue has been ongoing for 9 month(s). Pain is a 10/10 on the pain scale. Pain is described as aching, throbbing, and stabbing. Associated symptoms include pain. The pain is worse with working, lying on affected side, and any movement of the joint; nothing improves the pain. Previous treatments have included: NSAIDS.    Subjective   Review of Systems: Review of Systems   Constitutional:  Negative for chills, fever, unexpected weight gain and unexpected weight loss.   HENT:  Negative for congestion, postnasal drip and rhinorrhea.    Eyes:  Negative for blurred vision.   Respiratory:  Negative for shortness of breath.    Cardiovascular:  Negative for leg swelling.   Gastrointestinal:  Negative for abdominal pain, nausea and vomiting.   Genitourinary:  Negative for difficulty urinating.   Musculoskeletal:  Positive for arthralgias. Negative for gait problem, joint swelling and myalgias.   Skin:  Negative for skin lesions and wound.   Neurological:  Negative for dizziness, weakness, light-headedness and numbness.   Hematological:  Does not bruise/bleed easily.   Psychiatric/Behavioral:  Negative for depressed mood.         I have reviewed the following portions of the patient's history:History of Present Illness and review of systems.    Past Medical History:   Past Medical History:   Diagnosis Date    Anxiety     Asthma 2020    Use inhaler when needed    Cardiac arrhythmia     apparently    Depression 2020    Heart failure      Hypertension 202       Past Surgical History:   Past Surgical History:   Procedure Laterality Date    TUBAL ABDOMINAL LIGATION  2012       Family History:   Family History   Problem Relation Age of Onset    Diabetes Mother     Migraines Mother     Stroke Mother     Depression Father     Alcohol abuse Father     Depression Brother     Diabetes Other     Learning disabilities Other     ADD / ADHD Other     Cancer Other     Allergies Other     Migraines Other     Asthma Other     Other Other         CVA, blood disease    Seizures Other     Stroke Paternal Grandfather     Diabetes Paternal Grandmother     Stroke Maternal Uncle        Social History:   Social History     Socioeconomic History    Marital status:    Tobacco Use    Smoking status: Every Day     Packs/day: 1.00     Years: 15.00     Additional pack years: 0.00     Total pack years: 15.00     Types: Cigarettes     Start date: 1/1/1999    Smokeless tobacco: Never   Vaping Use    Vaping Use: Never used   Substance and Sexual Activity    Alcohol use: Never    Drug use: Not Currently     Types: Cocaine(coke), Marijuana, Methamphetamines    Sexual activity: Not Currently     Partners: Male     Birth control/protection: Condom, Same-sex partner       Medications:   Current Outpatient Medications:     albuterol sulfate  (90 Base) MCG/ACT inhaler, Inhale 2 puffs Every 4 (Four) Hours As Needed for Wheezing., Disp: 18 g, Rfl: 2    buprenorphine-naloxone (SUBOXONE) 8-2 MG per SL tablet, , Disp: , Rfl:     buPROPion XL (Wellbutrin XL) 150 MG 24 hr tablet, Take 1 tablet by mouth Daily., Disp: 90 tablet, Rfl: 0    FLUoxetine (PROzac) 20 MG capsule, Take 1 capsule by mouth Daily., Disp: 90 capsule, Rfl: 1    gabapentin (Neurontin) 800 MG tablet, Take 1 tablet by mouth 4 (Four) Times a Day. Cancel the 600mg prescription please and the 90 quantity, Disp: 120 tablet, Rfl: 2    hydrOXYzine (ATARAX) 25 MG tablet, Take 1 tablet by mouth 3 (Three) Times a Day As  "Needed for Anxiety., Disp: 30 tablet, Rfl: 2    ibuprofen (ADVIL,MOTRIN) 800 MG tablet, Take 1 tablet by mouth Every 8 (Eight) Hours As Needed for Moderate Pain., Disp: 90 tablet, Rfl: 5    Linzess 145 MCG capsule capsule, Take 1 capsule by mouth Every Morning Before Breakfast., Disp: 30 capsule, Rfl: 11    losartan (Cozaar) 50 MG tablet, Take 1 tablet by mouth Daily., Disp: 90 tablet, Rfl: 1    naloxone (NARCAN) 4 MG/0.1ML nasal spray, AS DIRECTED FOR OVERDOSE, THEN CALL 911, Disp: , Rfl:     nicotine (Nicoderm CQ) 21 MG/24HR patch, Place 1 patch on the skin as directed by provider Daily., Disp: 30 patch, Rfl: 1    nicotine polacrilex (Nicorette) 4 MG gum, Chew 1 each As Needed for Smoking Cessation., Disp: 100 each, Rfl: 2    Umeclidinium-Vilanterol (ANORO ELLIPTA) 62.5-25 MCG/ACT aerosol powder  inhaler, Inhale 1 puff Daily., Disp: 1 each, Rfl: 11    Allergies:   Allergies   Allergen Reactions    Ciprofloxacin Shortness Of Breath    Sulfa Antibiotics Shortness Of Breath and Itching       I reviewed the patient's chief complaint, history of present illness, review of systems, past medical history, surgical history, family history, social history, medications and allergy list.     Objective    Vital Signs:   Vitals:    10/10/23 1348   BP: 142/98   Weight: 81.2 kg (179 lb)   Height: 160 cm (62.99\")     Body mass index is 31.72 kg/mý. BMI is >= 30 and <35. (Class 1 Obesity). The following options were offered after discussion;: exercise counseling/recommendations and nutrition counseling/recommendations     In this visit the patient was advised to stop smoking and was offered tobacco cessation measures and resources, including NRT and/or medication intervention. At least 5 minutes was spent on face-to-face counseling regarding smoking cessation.      Ortho Exam:  Constitutional: General Appearance: healthy-appearing, NAD, and normal body habitus.   Psychiatric: Mood and Affect: normal mood and affect and active and " alert.   Cardiovascular System: Arterial Pulses Right: radial normal. Arterial Pulses Left: radial normal.   C-Spine/Neck: Active Range of Motion: no crepitus or pain elicited on motion and flexion normal, extension normal, rotation normal, and lateral flexion normal.   Shoulders: Inspection Right: no misalignment, erythema, induration, swelling, or warmth and AC prominence normal. Inspection Left: no misalignment, erythema, induration, swelling, or warmth and AC prominence normal. Bony Palpation Right: tenderness of the acromioclavicular joint, the greater tuberosity, and the bicipital groove and no tenderness of the clavicle. Soft Tissue Palpation Right: tenderness of the supraspinatus, the infraspinatus, the glenohumeral joint region, and the lateral cuff insertion. Active Range of Motion Right: limited. Special Tests Right: Hawkin's test positive and empty can sign positive.   exam RIGHT shoulder: forward flexion approximately 100 degrees. Abduction 100 degrees. Internal rotation SI. Motor testing supraspinatus 4/5  exam LEFT shoulder: forward flexion approximately 140 degrees. Abduction 140 degrees. Internal rotation L1. Motor testing supraspinatus 5/5    Results Review:   Imaging Results (Last 24 Hours)       ** No results found for the last 24 hours. **        I personally reviewed the radiographs of the right shoulder.  No acute fracture or dislocation.  There are no significant degenerative changes in the glenohumeral or AC joints.    Procedures    Assessment / Plan    Assessment/Plan:   Diagnoses and all orders for this visit:    1. Right rotator cuff tendinitis (Primary)    2. Cervical radiculopathy  -     Ambulatory Referral to Pain Management Clinic    Other orders  -     - Large Joint Arthrocentesis: R subacromial bursa    Evaluation for right shoulder pain that has been ongoing since January 2023.  She states that symptoms began after she received COVID-vaccine in this arm.  She denies any other  mechanism of injury.  Previous treatment thus far is included antibiotics for presumed infection by her primary care provider after the vaccination.  She is currently also on Suboxone as well as gabapentin for other orthopedic issues in her lumbar spine.  Today, her pain is located in the lateral shoulder as well as into the trapezius, levator scapula, and paraspinals on the right side of her cervical spine.  She does have limited range of motion actively in the right shoulder.  This also increases pain in the lateral shoulder.  Her radiographs do not show acute or degenerative issues in either the glenohumeral or AC joints.  Her history is more consistent with a diagnosis of rotator cuff tendinitis.  However, with pain in her neck especially with motion of the neck as well as numbness and tingling into the hand, believe she also is dealing with cervical radiculopathy.  I recommended that today we treat the shoulder.  I recommended that we inject the subacromial bursa with corticosteroid.  Also give her a series of home exercises to perform with exercise bands.  She was given the bands as well.  We will also arrange for referral to see Dr. Bryan in this office for evaluation of her cervical spine.  Risks and benefits of the procedure were discussed.  She like to proceed and tolerated procedure well.  See procedure note.  I will see her back in 6 weeks to monitor the shoulder and follow-up on that referral.    Previous documentation reviewed: 8/24/2023-office visit-Anisa AUGUST.    Previous imaging studies reviewed: 9/25/2023-radiographs of the right shoulder.    Previous laboratory results reviewed: 9/28/2023-hemoglobin A1c 5.9%.    Follow Up:   Return in about 6 weeks (around 11/21/2023) for Recheck.      Lino Browning MD  [facility]

## 2023-10-25 ENCOUNTER — TELEPHONE (OUTPATIENT)
Dept: ORTHOPEDIC SURGERY | Facility: CLINIC | Age: 38
End: 2023-10-25
Payer: MEDICAID

## 2023-10-25 NOTE — TELEPHONE ENCOUNTER
Caller: Heidi Webb    Relationship to patient: Self    Best call back number: [phone]    Patient is needing: PATIENT IS HAVING MIGRAINES AND EXPERIENCING PRESSURE IN THE BACK OF HER HEAD. SHE ALSO THROWS UP AFTER EATING/DRINKING. SHE BELIEVES IT IS FROM THE INJECTION SHE RECEIVED. WOULD LIKE A CALL BACK TO DISCUSS

## 2023-10-25 NOTE — TELEPHONE ENCOUNTER
Spoke to pt who states she had not seen her MyChart message reply from our staff prior to calling but I re-iterated to her the message from Torrie WEINER; Encouraged patient to reach out to her PCP or present to Urgent Care for her symptoms. She verbalized understanding.    Angelica ASHRAF CMA ([facility]), ROT

## 2023-11-20 ENCOUNTER — TELEPHONE (OUTPATIENT)
Dept: FAMILY MEDICINE CLINIC | Facility: CLINIC | Age: 38
End: 2023-11-20
Payer: MEDICAID

## 2023-11-20 RX ORDER — SPINOSAD 9 MG/ML
1 SUSPENSION TOPICAL ONCE
Qty: 120 ML | Refills: 1 | Status: SHIPPED | OUTPATIENT
Start: 2023-11-20 | End: 2023-11-20

## 2023-11-20 NOTE — TELEPHONE ENCOUNTER
Caller: Heidi Webb    Relationship: Self    Best call back number: [phone]     What medication are you requesting: LICE MEDICATION    What are your current symptoms: LICE        If a prescription is needed, what is your preferred pharmacy and phone number: [id number] - [place], [place] - [address] [facility] [address] - [phone]  - [phone]     Additional notes:PATIENT REQUESTED SOME LICE SHAMPOO FOR HER 3 BOYS  CALL TO ADVISE PATIENT     WINSTON PATINO

## 2023-11-21 ENCOUNTER — TELEPHONE (OUTPATIENT)
Dept: FAMILY MEDICINE CLINIC | Facility: CLINIC | Age: 38
End: 2023-11-21
Payer: MEDICAID

## 2023-11-21 NOTE — TELEPHONE ENCOUNTER
LVM for pt to call back  HUB TO RELAY  Looks like her losartan has refills? Has pt not been taking her BP medication?

## 2023-11-21 NOTE — TELEPHONE ENCOUNTER
Pt's blood pressure reads 178/146 and would like her blood pressure medicine refilled. Best call back number [phone]

## 2023-12-03 ENCOUNTER — HOSPITAL ENCOUNTER (OUTPATIENT)
Dept: MRI IMAGING | Facility: HOSPITAL | Age: 38
Discharge: HOME | End: 2023-12-03
Admitting: OBSTETRICS & GYNECOLOGY
Payer: MEDICAID

## 2023-12-03 DIAGNOSIS — R10.30 LOWER ABDOMINAL PAIN: ICD-10-CM

## 2023-12-03 DIAGNOSIS — G89.29 CHRONIC BILATERAL LOW BACK PAIN WITH BILATERAL SCIATICA: ICD-10-CM

## 2023-12-03 DIAGNOSIS — M54.42 CHRONIC BILATERAL LOW BACK PAIN WITH BILATERAL SCIATICA: ICD-10-CM

## 2023-12-03 DIAGNOSIS — M54.41 CHRONIC BILATERAL LOW BACK PAIN WITH BILATERAL SCIATICA: ICD-10-CM

## 2023-12-03 PROCEDURE — A9577 INJ MULTIHANCE: HCPCS | Performed by: OBSTETRICS & GYNECOLOGY

## 2023-12-03 PROCEDURE — 0 GADOBENATE DIMEGLUMINE 529 MG/ML SOLUTION: Performed by: OBSTETRICS & GYNECOLOGY

## 2023-12-03 PROCEDURE — 72158 MRI LUMBAR SPINE W/O & W/DYE: CPT

## 2023-12-03 RX ADMIN — GADOBENATE DIMEGLUMINE 15 ML: 529 INJECTION, SOLUTION INTRAVENOUS at 10:23

## 2023-12-04 PROBLEM — M51.379 DDD (DEGENERATIVE DISC DISEASE), LUMBOSACRAL: Status: ACTIVE | Noted: 2023-12-04

## 2023-12-04 PROBLEM — M51.37 DDD (DEGENERATIVE DISC DISEASE), LUMBOSACRAL: Status: ACTIVE | Noted: 2023-12-04

## 2023-12-05 ENCOUNTER — TELEPHONE (OUTPATIENT)
Dept: OBSTETRICS AND GYNECOLOGY | Facility: CLINIC | Age: 38
End: 2023-12-05

## 2023-12-05 ENCOUNTER — TELEPHONE (OUTPATIENT)
Dept: FAMILY MEDICINE CLINIC | Facility: CLINIC | Age: 38
End: 2023-12-05
Payer: MEDICAID

## 2023-12-05 NOTE — TELEPHONE ENCOUNTER
Caller: Heidi Webb    Relationship: Self    Best call back number: [phone]     Caller requesting test results: YES    What test was performed:    MR [facility] LSPINE W WO CONTRAST       When was the test performed: 12.03.23    Where was the test performed:     [facility]       Additional notes: PATIENT REQUESTS CALL BACK WITH QUESTIONS ON HER MRI RESULTS.    PATIENT STATED SHE CALLED HER OBGYN TO ASK QUESTIONS BUT WAS NOT ABLE TO GET AHOLD OF ANYBODY.

## 2023-12-05 NOTE — TELEPHONE ENCOUNTER
Caller: Heidi Webb    Relationship: Self    Best call back number: [phone]    What is the best time to reach you:     ANY    Who are you requesting to speak with (clinical staff, provider,  specific staff member):     DR WRAY      What was the call regarding:     RESULTS OF MRI ARE IN MYCHART  PT WOULD LIKE TO DISCUSS FINDING    PLEASE CALL AT [facility]

## 2023-12-06 DIAGNOSIS — J45.40 MODERATE PERSISTENT ASTHMA, UNSPECIFIED WHETHER COMPLICATED: ICD-10-CM

## 2023-12-06 RX ORDER — ALBUTEROL SULFATE 90 UG/1
2 AEROSOL, METERED RESPIRATORY (INHALATION) EVERY 4 HOURS PRN
Qty: 8 G | Refills: 2 | Status: SHIPPED | OUTPATIENT
Start: 2023-12-06

## 2023-12-08 DIAGNOSIS — K59.04 CHRONIC IDIOPATHIC CONSTIPATION: ICD-10-CM

## 2023-12-11 RX ORDER — LINACLOTIDE 145 UG/1
145 CAPSULE, GELATIN COATED ORAL EVERY MORNING
Qty: 30 CAPSULE | Refills: 0 | Status: SHIPPED | OUTPATIENT
Start: 2023-12-11

## 2023-12-11 RX ORDER — BUPROPION HYDROCHLORIDE 150 MG/1
150 TABLET ORAL DAILY
Qty: 30 TABLET | Refills: 0 | Status: SHIPPED | OUTPATIENT
Start: 2023-12-11 | End: 2023-12-13

## 2023-12-11 NOTE — TELEPHONE ENCOUNTER
Spoke with patient regarding results of her MRI and Dr Carrera message. Advised pt to follow up with her PCP as recommended by Dr Carrera for further management of these results and for referral to PT or neurosurgery if needed. Pt BETI.      -JENNIE Dupree

## 2023-12-12 NOTE — TELEPHONE ENCOUNTER
Pt contacted, gyn told her to go over results with crystal for whatever reason.     Sent to the front to schedule appt;.

## 2023-12-13 ENCOUNTER — TELEMEDICINE (OUTPATIENT)
Dept: FAMILY MEDICINE CLINIC | Facility: CLINIC | Age: 38
End: 2023-12-13
Payer: MEDICAID

## 2023-12-13 DIAGNOSIS — M54.50 LUMBAR PAIN: Primary | ICD-10-CM

## 2023-12-13 PROCEDURE — 99214 OFFICE O/P EST MOD 30 MIN: CPT | Performed by: NURSE PRACTITIONER

## 2023-12-13 PROCEDURE — 1159F MED LIST DOCD IN RCRD: CPT | Performed by: NURSE PRACTITIONER

## 2023-12-13 PROCEDURE — 1160F RVW MEDS BY RX/DR IN RCRD: CPT | Performed by: NURSE PRACTITIONER

## 2023-12-13 RX ORDER — GABAPENTIN 800 MG/1
800 TABLET ORAL 4 TIMES DAILY
Qty: 120 TABLET | Refills: 2 | Status: SHIPPED | OUTPATIENT
Start: 2023-12-13

## 2024-01-15 RX ORDER — BUPROPION HYDROCHLORIDE 150 MG/1
150 TABLET ORAL DAILY
Qty: 30 TABLET | Refills: 0 | OUTPATIENT
Start: 2024-01-15

## 2024-01-22 ENCOUNTER — TELEPHONE (OUTPATIENT)
Dept: FAMILY MEDICINE CLINIC | Facility: CLINIC | Age: 39
End: 2024-01-22
Payer: MEDICAID

## 2024-01-22 DIAGNOSIS — K59.04 CHRONIC IDIOPATHIC CONSTIPATION: ICD-10-CM

## 2024-01-22 RX ORDER — LINACLOTIDE 145 UG/1
145 CAPSULE, GELATIN COATED ORAL EVERY MORNING
Qty: 30 CAPSULE | Refills: 0 | Status: SHIPPED | OUTPATIENT
Start: 2024-01-22

## 2024-01-22 NOTE — TELEPHONE ENCOUNTER
Caller: Edmond Heidi    Relationship: Self    Best call back number: [phone]     Requested Prescriptions:   Requested Prescriptions      No prescriptions requested or ordered in this encounter    [facility]     Pharmacy where request should be sent: [id number] - [place], [place] - [address] [place] [address] - [phone] PH - [phone] FX     Last office visit with prescribing clinician: 9/28/2023   Last telemedicine visit with prescribing clinician: 12/13/2023   Next office visit with prescribing clinician: Visit date not found     Does the patient have less than a 3 day supply:  [x] Yes  [] No    Loraine Glaser Rep   01/22/24 10:09 EST

## 2024-01-25 ENCOUNTER — TELEMEDICINE (OUTPATIENT)
Dept: FAMILY MEDICINE CLINIC | Facility: CLINIC | Age: 39
End: 2024-01-25
Payer: MEDICAID

## 2024-01-25 DIAGNOSIS — M51.37 DDD (DEGENERATIVE DISC DISEASE), LUMBOSACRAL: ICD-10-CM

## 2024-01-25 DIAGNOSIS — B85.0 HEAD LICE: ICD-10-CM

## 2024-01-25 DIAGNOSIS — F33.9 DEPRESSION, RECURRENT: Primary | ICD-10-CM

## 2024-01-25 DIAGNOSIS — G62.9 NEUROPATHY: ICD-10-CM

## 2024-01-25 PROCEDURE — 1160F RVW MEDS BY RX/DR IN RCRD: CPT | Performed by: NURSE PRACTITIONER

## 2024-01-25 PROCEDURE — 1159F MED LIST DOCD IN RCRD: CPT | Performed by: NURSE PRACTITIONER

## 2024-01-25 PROCEDURE — 99214 OFFICE O/P EST MOD 30 MIN: CPT | Performed by: NURSE PRACTITIONER

## 2024-01-25 RX ORDER — PREGABALIN 200 MG/1
200 CAPSULE ORAL 2 TIMES DAILY
Qty: 60 CAPSULE | Refills: 2 | Status: SHIPPED | OUTPATIENT
Start: 2024-01-25

## 2024-01-25 RX ORDER — ESCITALOPRAM OXALATE 10 MG/1
10 TABLET ORAL DAILY
Qty: 90 TABLET | Refills: 1 | Status: SHIPPED | OUTPATIENT
Start: 2024-01-25

## 2024-01-25 RX ORDER — SPINOSAD 9 MG/ML
1 SUSPENSION TOPICAL ONCE
Qty: 120 ML | Refills: 1 | Status: SHIPPED | OUTPATIENT
Start: 2024-01-25 | End: 2024-01-25

## 2024-01-25 NOTE — PROGRESS NOTES
Chief Complaint  Head Lice (Has not seen anything in her hair, she is just itching )  This was a video and audio enabled telemedicine encounter. The patient was in home. The provider was in office.     Subjective          Heidi Webb presents to [facility]  History of Present Illness  Pt has had itching in her scalp for the past week. She was exposed to head lice. She has had increased depression since her mother passed away a few weeks ago. She states her Gapapentin is not working well and would like to change to Lyrica.       Objective   Vital Signs:   There were no vitals taken for this visit.    There is no height or weight on file to calculate BMI.    Review of Systems   Constitutional:  Negative for fatigue and fever.   Respiratory:  Negative for shortness of breath.    Cardiovascular:  Negative for chest pain, palpitations and leg swelling.   Neurological:  Negative for syncope.   Psychiatric/Behavioral:  The patient is not nervous/anxious.           Current Outpatient Medications:     albuterol sulfate HFA (Ventolin HFA) 108 (90 Base) MCG/ACT inhaler, TAKE 2 PUFFS BY MOUTH EVERY 4 HOURS AS NEEDED FOR WHEEZE, Disp: 8 g, Rfl: 2    buprenorphine-naloxone (SUBOXONE) 8-2 MG per SL tablet, , Disp: , Rfl:     gabapentin (Neurontin) 800 MG tablet, Take 1 tablet by mouth 4 (Four) Times a Day. Cancel the 600mg prescription please and the 90 quantity, Disp: 120 tablet, Rfl: 2    hydrOXYzine (ATARAX) 25 MG tablet, Take 1 tablet by mouth 3 (Three) Times a Day As Needed for Anxiety., Disp: 30 tablet, Rfl: 2    ibuprofen (ADVIL,MOTRIN) 800 MG tablet, Take 1 tablet by mouth Every 8 (Eight) Hours As Needed for Moderate Pain., Disp: 90 tablet, Rfl: 5    Linzess 145 MCG capsule capsule, TAKE 1 CAPSULE BY MOUTH EVERY DAY IN THE MORNING, Disp: 30 capsule, Rfl: 0    losartan (Cozaar) 50 MG tablet, Take 1 tablet by mouth Daily., Disp: 90 tablet, Rfl: 1    naloxone (NARCAN) 4 MG/0.1ML nasal spray,  AS DIRECTED FOR OVERDOSE, THEN CALL 911, Disp: , Rfl:     nicotine (Nicoderm CQ) 21 MG/24HR patch, Place 1 patch on the skin as directed by provider Daily., Disp: 30 patch, Rfl: 1    nicotine polacrilex (Nicorette) 4 MG gum, Chew 1 each As Needed for Smoking Cessation., Disp: 100 each, Rfl: 2    Umeclidinium-Vilanterol (ANORO ELLIPTA) 62.5-25 MCG/ACT aerosol powder  inhaler, Inhale 1 puff Daily., Disp: 1 each, Rfl: 11    escitalopram (Lexapro) 10 MG tablet, Take 1 tablet by mouth Daily., Disp: 90 tablet, Rfl: 1    pregabalin (Lyrica) 200 MG capsule, Take 1 capsule by mouth 2 (Two) Times a Day., Disp: 60 capsule, Rfl: 2    Spinosad 0.9 % suspension, Apply 1 Application topically 1 (One) Time for 1 dose. Apply to dry hair and leave in place for 10 minutes. Thoroughly rinse. Repeat in 1 week if needed., Disp: 120 mL, Rfl: 1      Allergies: Ciprofloxacin and Sulfa antibiotics    Physical Exam  Constitutional:       Appearance: Normal appearance.   Neurological:      Mental Status: She is alert.   Psychiatric:         Mood and Affect: Mood normal.         Behavior: Behavior normal.         Thought Content: Thought content normal.         Judgment: Judgment normal.          Result Review :                   Assessment and Plan    Diagnoses and all orders for this visit:    1. Depression, recurrent (Primary)  Comments:  Begin Lexapro. I advised counseling. F/U in 1 month if not improving.  Orders:  -     escitalopram (Lexapro) 10 MG tablet; Take 1 tablet by mouth Daily.  Dispense: 90 tablet; Refill: 1    2. DDD (degenerative disc disease), lumbosacral  Comments:  Try Lyrica instead of Gabapentin. F/U in 3 months.  Orders:  -     pregabalin (Lyrica) 200 MG capsule; Take 1 capsule by mouth 2 (Two) Times a Day.  Dispense: 60 capsule; Refill: 2    3. Neuropathy  -     pregabalin (Lyrica) 200 MG capsule; Take 1 capsule by mouth 2 (Two) Times a Day.  Dispense: 60 capsule; Refill: 2    4. Head lice  Comments:  Natroba as  directed. Treat home environment. RTC if not improving.  Orders:  -     Spinosad 0.9 % suspension; Apply 1 Application topically 1 (One) Time for 1 dose. Apply to dry hair and leave in place for 10 minutes. Thoroughly rinse. Repeat in 1 week if needed.  Dispense: 120 mL; Refill: 1                Follow Up   Return in about 3 months (around 4/25/2024) for Recheck.  Patient was given instructions and counseling regarding her condition or for health maintenance advice. Please see specific information pulled into the AVS if appropriate.     MARIANGEL Couch

## 2024-01-30 ENCOUNTER — TELEPHONE (OUTPATIENT)
Dept: FAMILY MEDICINE CLINIC | Facility: CLINIC | Age: 39
End: 2024-01-30
Payer: MEDICAID

## 2024-01-30 ENCOUNTER — NURSE TRIAGE (OUTPATIENT)
Dept: CALL CENTER | Facility: HOSPITAL | Age: 39
End: 2024-01-30
Payer: MEDICAID

## 2024-01-30 DIAGNOSIS — M54.50 LUMBAR PAIN: ICD-10-CM

## 2024-01-30 NOTE — TELEPHONE ENCOUNTER
Patient was transferred from Nurse Triage. She states that the Lyrica that was prescribed by Anisa, made her vomit, sick to her stomach, and shaking so bad she thought she was going to pass out. She was told by Nurse Triage, to stop taking the medicine. Please advise

## 2024-01-30 NOTE — TELEPHONE ENCOUNTER
HUB Call - caller took first dose of Lyrica last night and experienced ill effects. Unable to get thru to office.    Spoke with caller, who after taking first dose of Lyrica began experiencing nausea, vomited x 1, felt like heart was racing and felt like she was going to pass out. Those symptoms have resolved but states just feels shaky this a.m.    Guidelines followed, protocols reviewed. Caller was warm transferred to [id number] after giving staff member report of above information.    Reason for Disposition   Caller has URGENT medicine question about med that PCP or specialist prescribed and triager unable to answer question    Additional Information   Negative: Intentional drug overdose and suicidal thoughts or ideas   Negative: Drug overdose and triager unable to answer question   Negative: Caller requesting a renewal or refill of a medicine patient is currently taking   Negative: Caller requesting information unrelated to medicine   Negative: Caller requesting information about COVID-19 Vaccine   Negative: Caller requesting information about Emergency Contraception   Negative: Caller requesting information about Combined Birth Control Pills   Negative: Caller requesting information about Progestin Birth Control Pills   Negative: Caller requesting information about Post-Op pain or medicines   Negative: Caller requesting a prescription antibiotic (such as penicillin) for Strep throat and has a positive culture result   Negative: Caller requesting a prescription anti-viral med (such as Tamiflu) and has influenza (flu) symptoms   Negative: Immunization reaction suspected   Negative: Rash while taking a medicine or within 3 days of stopping it   Negative: Asthma and having symptoms of asthma (cough, wheezing, etc.)   Negative: Symptom of illness (e.g., headache, abdominal pain, earache, vomiting) that are more than mild   Negative: Breastfeeding questions about mother's medicines and diet   Negative: MORE THAN A DOUBLE  "DOSE of a prescription or over-the-counter (OTC) drug   Negative: DOUBLE DOSE (an extra dose or lesser amount) of prescription drug and any symptoms (e.g., dizziness, nausea, pain, sleepiness)   Negative: DOUBLE DOSE (an extra dose or lesser amount) of over-the-counter (OTC) drug and any symptoms (e.g., dizziness, nausea, pain, sleepiness)   Negative: Took another person's prescription drug   Negative: DOUBLE DOSE (an extra dose or lesser amount) of prescription drug and NO symptoms  (Exception: A double dose of antibiotics.)   Negative: Diabetes drug error or overdose (e.g., took wrong type of insulin or took extra dose)   Negative: Caller has medication question about med NOT prescribed by PCP and triager unable to answer question (e.g., compatibility with other med, storage)   Negative: Prescription not at pharmacy and was prescribed by PCP recently  (Exception: triager has access to EMR and prescription is recorded there. Go to Home Care and confirm for pharmacy.)   Negative: Pharmacy calling with prescription question and triager unable to answer question    Answer Assessment - Initial Assessment Questions  1. NAME of MEDICINE: \"What medicine(s) are you calling about?\"      Lyrica  2. QUESTION: \"What is your question?\" (e.g., double dose of medicine, side effect)      Took first dose last night, experienced Nausea, vomited x 1, heart was racing and felt like she was going to pass out. States has resolved but feels a little shaky  3. PRESCRIBER: \"Who prescribed the medicine?\" Reason: if prescribed by specialist, call should be referred to that group.      Anisa Conteh, APRN  4. SYMPTOMS: \"Do you have any symptoms?\" If Yes, ask: \"What symptoms are you having?\"  \"How bad are the symptoms (e.g., mild, moderate, severe)      Ill effects resolved last night, just feels shaky this a.m.  5. PREGNANCY:  \"Is there any chance that you are pregnant?\" \"When was your last menstrual period?\"      Not asked    Protocols " used: Medication Question Call-ADULT-[place]

## 2024-01-31 RX ORDER — GABAPENTIN 800 MG/1
800 TABLET ORAL 4 TIMES DAILY
Qty: 120 TABLET | Refills: 2 | Status: SHIPPED | OUTPATIENT
Start: 2024-01-31

## 2024-01-31 NOTE — TELEPHONE ENCOUNTER
I will resend Gabapentin. Thanks! Let's go ahead and get her scheduled with someone else in 3 months for her follow up on this med. Thanks!

## 2024-02-01 NOTE — TELEPHONE ENCOUNTER
Called and spoke to patient, and was able to get her a transfer of care appointment to Zofia Payne in May.

## 2024-02-07 ENCOUNTER — TELEPHONE (OUTPATIENT)
Dept: FAMILY MEDICINE CLINIC | Facility: CLINIC | Age: 39
End: 2024-02-07
Payer: MEDICAID

## 2024-02-07 NOTE — TELEPHONE ENCOUNTER
Caller: [place] Heidi    Relationship: Self    Best call back number: [phone]       What was the call regarding: gabapentin (Neurontin) 800 MG tablet     THIS MEDICATION NEEDS PRIOR AUTHORIZATION OR AN OK FROM PCP BEFORE PHARMACY WILL FILL THIS FOR THE PATIENT.     [id number] - [place], [place] - [address] AT [place] [address] - [phone] [facility] [phone] FX     Is it okay if the provider responds through [employer]: YES      CALL: [phone] PATIENTS AUNTS PHONE, PATIENTS PHONE IS DEAD AT THE MOMENT.

## 2024-02-07 NOTE — TELEPHONE ENCOUNTER
Pharm contacted, they already cancelled the remaining refills on the lyrica. Gave ok to give nik. Pt contacted

## 2024-02-14 DIAGNOSIS — K59.04 CHRONIC IDIOPATHIC CONSTIPATION: ICD-10-CM

## 2024-02-15 RX ORDER — LINACLOTIDE 145 UG/1
145 CAPSULE, GELATIN COATED ORAL EVERY MORNING
Qty: 30 CAPSULE | Refills: 0 | Status: SHIPPED | OUTPATIENT
Start: 2024-02-15

## 2024-02-16 ENCOUNTER — TELEPHONE (OUTPATIENT)
Dept: FAMILY MEDICINE CLINIC | Facility: CLINIC | Age: 39
End: 2024-02-16
Payer: MEDICAID

## 2024-02-16 DIAGNOSIS — R06.02 SHORTNESS OF BREATH: Primary | ICD-10-CM

## 2024-02-19 DIAGNOSIS — J45.40 MODERATE PERSISTENT ASTHMA, UNSPECIFIED WHETHER COMPLICATED: ICD-10-CM

## 2024-02-19 RX ORDER — ALBUTEROL SULFATE 90 UG/1
2 AEROSOL, METERED RESPIRATORY (INHALATION) EVERY 4 HOURS PRN
Qty: 8 G | Refills: 2 | Status: SHIPPED | OUTPATIENT
Start: 2024-02-19

## 2024-03-06 DIAGNOSIS — K59.04 CHRONIC IDIOPATHIC CONSTIPATION: ICD-10-CM

## 2024-03-06 DIAGNOSIS — M54.50 LUMBAR PAIN: ICD-10-CM

## 2024-03-06 DIAGNOSIS — J45.40 MODERATE PERSISTENT ASTHMA, UNSPECIFIED WHETHER COMPLICATED: ICD-10-CM

## 2024-03-06 RX ORDER — ALBUTEROL SULFATE 90 UG/1
2 AEROSOL, METERED RESPIRATORY (INHALATION) EVERY 4 HOURS PRN
Qty: 8 G | Refills: 1 | Status: SHIPPED | OUTPATIENT
Start: 2024-03-06

## 2024-03-06 RX ORDER — LINACLOTIDE 145 UG/1
145 CAPSULE, GELATIN COATED ORAL EVERY MORNING
Qty: 30 CAPSULE | Refills: 1 | Status: SHIPPED | OUTPATIENT
Start: 2024-03-06

## 2024-03-06 RX ORDER — IBUPROFEN 800 MG/1
800 TABLET ORAL EVERY 8 HOURS PRN
Qty: 90 TABLET | Refills: 1 | Status: SHIPPED | OUTPATIENT
Start: 2024-03-06

## 2024-03-06 NOTE — TELEPHONE ENCOUNTER
Caller: Heidi Webb    Relationship: Self    Best call back number: [phone]     Requested Prescriptions:   Requested Prescriptions     Pending Prescriptions Disp Refills    ibuprofen (ADVIL,MOTRIN) 800 MG tablet [Pharmacy Med Name: IBUPROFEN 800 MG TABLET] 90 tablet 5     Sig: TAKE 1 TABLET BY MOUTH EVERY 8 HOURS AS NEEDED FOR MODERATE PAIN    Linzess 145 MCG capsule capsule [Pharmacy Med Name: LINZESS 145 MCG CAPSULE] 30 capsule 0     Sig: TAKE 1 CAPSULE BY MOUTH EVERY DAY IN THE MORNING    albuterol sulfate HFA (Ventolin HFA) 108 (90 Base) MCG/ACT inhaler 8 g 2     Sig: Inhale 2 puffs Every 4 (Four) Hours As Needed for Wheezing.        Pharmacy where request should be sent: [facility] #[id number] - [place], [place] - [address] [place] [address] AT [address] [phone] [facility] [phone] FX     Last office visit with prescribing clinician: 9/28/2023   Last telemedicine visit with prescribing clinician: 1/25/2024   Next office visit with prescribing clinician: Visit date not found     Additional details provided by patient: PATIENT HAS 1 DAY LEFT OF THESE MEDICATIONS.    Does the patient have less than a 3 day supply:  [x] Yes  [] No    Would you like a call back once the refill request has been completed: [] Yes [x] No    If the office needs to give you a call back, can they leave a voicemail: [] Yes [x] No    Loraine Haro   03/06/24 13:02 EST

## 2024-03-13 ENCOUNTER — TELEPHONE (OUTPATIENT)
Dept: FAMILY MEDICINE CLINIC | Facility: CLINIC | Age: 39
End: 2024-03-13
Payer: MEDICAID

## 2024-03-13 NOTE — TELEPHONE ENCOUNTER
Hub staff attempted to follow warm transfer process and was unsuccessful     Caller: Heidi Webb    Relationship to patient: Self    Best call back number: [phone]     Patient is needing: PATIENT WAS SCHEDULED FOR AN OFFICE VISIT WITH CHRISTOPHER ESCOBAR TOMORROW 3/14/24 AT 11:15 TO ADDRESS A 4 DAY LONG HEADACHE.    HER SON HAS AN APPOINTMENT AT 11:00 WITH EARL CRISTINA.     WILL HER APPOINTMENT BEING 15 MINUTES AFTER HER SON CAUSE ANY ISSUES?     PLEASE ADVISE PATIENT'S MOTHER TO COORDINATE.

## 2024-03-20 ENCOUNTER — TELEPHONE (OUTPATIENT)
Dept: FAMILY MEDICINE CLINIC | Facility: CLINIC | Age: 39
End: 2024-03-20

## 2024-03-20 NOTE — TELEPHONE ENCOUNTER
Called and left patient a message letting her know that she will need to make an appointment to be seen for symptoms and any samples would have to be left here at our office where her pcp is located.

## 2024-03-20 NOTE — TELEPHONE ENCOUNTER
Caller: Heidi Webb    Relationship: Self    Best call back number: [phone]     What is the best time to reach you: ANYTIME    Who are you requesting to speak with (clinical staff, provider,  specific staff member): CLINICAL STAFF    What was the call regarding: THE PATIENT WOULD LIKE TO KNOW IF SHE CAN DROP HER URINE OFF AT THE [facility]? SHE STATED THAT SHE FEELS LIKE SHE HAS A URINARY TRACT INFECTION.    THE PATIENT WOULD ALSO LIKE TO KNOW IF PCP CAN CALL IN SOMETHING FOR HEAD CONGESTION.

## 2024-04-13 ENCOUNTER — TELEPHONE (OUTPATIENT)
Dept: FAMILY MEDICINE CLINIC | Facility: CLINIC | Age: 39
End: 2024-04-13
Payer: MEDICAID

## 2024-04-13 NOTE — TELEPHONE ENCOUNTER
"Patient called the office requesting an appt for coughing up blood.   I spoke with Dr. Springer as it was Saturday when pt called the office. Dr. Springer advised me to tell the pt to go to the ER and that an appt for pt's symptoms was not appropriate for an appt.   I advised the patient she needs to go to the ER as she would need imaging and we do not have access to imaging on weekends.   Patient continued to decline the ER and wanted an appt for \"back pain\" with Zofia Payne. I offered to schedule her an [facility] new provider appt with Zofia but I did again advise the ER for pt's symptoms.   Patient hung up the phone.   "

## 2024-05-02 DIAGNOSIS — M54.50 LUMBAR PAIN: ICD-10-CM

## 2024-05-02 RX ORDER — IBUPROFEN 800 MG/1
800 TABLET ORAL EVERY 8 HOURS PRN
Qty: 90 TABLET | Refills: 1 | OUTPATIENT
Start: 2024-05-02

## 2024-05-04 DIAGNOSIS — M54.50 LUMBAR PAIN: ICD-10-CM

## 2024-05-06 RX ORDER — IBUPROFEN 800 MG/1
800 TABLET ORAL EVERY 8 HOURS PRN
Qty: 90 TABLET | Refills: 1 | OUTPATIENT
Start: 2024-05-06

## 2024-05-10 ENCOUNTER — TELEPHONE (OUTPATIENT)
Dept: FAMILY MEDICINE CLINIC | Facility: CLINIC | Age: 39
End: 2024-05-10
Payer: MEDICAID

## 2024-05-10 NOTE — TELEPHONE ENCOUNTER
Wants pulmonologist closer to [place]. Missed appt today because it was so far away    Also states that she saw you back in April of 2023 for arm pain after covid shot and the record entered says arm pain post flu shot. She says that needs to be corrected. Also you referred her somewhere and got an injection in her shoulder for her pain but that never helped and now the pain is getting worse and wants to know what you suggests.

## 2024-05-13 NOTE — TELEPHONE ENCOUNTER
Hub to relay   Looks like she has an appt with cali tomorrow so she can discuss this with her and get some new referrals. Thanks!

## 2024-05-13 NOTE — TELEPHONE ENCOUNTER
Looks like she has an appt with cali tomorrow so she can discuss this with her and get some new referrals. Thanks!

## 2024-05-14 ENCOUNTER — OFFICE VISIT (OUTPATIENT)
Dept: FAMILY MEDICINE CLINIC | Facility: CLINIC | Age: 39
End: 2024-05-14
Payer: MEDICAID

## 2024-05-14 VITALS
DIASTOLIC BLOOD PRESSURE: 90 MMHG | SYSTOLIC BLOOD PRESSURE: 146 MMHG | BODY MASS INDEX: 31.98 KG/M2 | HEART RATE: 77 BPM | OXYGEN SATURATION: 98 % | WEIGHT: 180.5 LBS | HEIGHT: 63 IN

## 2024-05-14 DIAGNOSIS — M79.601 RIGHT ARM PAIN: ICD-10-CM

## 2024-05-14 DIAGNOSIS — I10 PRIMARY HYPERTENSION: ICD-10-CM

## 2024-05-14 DIAGNOSIS — K59.04 CHRONIC IDIOPATHIC CONSTIPATION: ICD-10-CM

## 2024-05-14 DIAGNOSIS — Z79.899 CONTROLLED SUBSTANCE AGREEMENT SIGNED: ICD-10-CM

## 2024-05-14 DIAGNOSIS — M54.50 LUMBAR PAIN: ICD-10-CM

## 2024-05-14 DIAGNOSIS — Z00.00 ANNUAL PHYSICAL EXAM: Primary | ICD-10-CM

## 2024-05-14 DIAGNOSIS — Z13.220 SCREENING FOR LIPID DISORDERS: ICD-10-CM

## 2024-05-14 DIAGNOSIS — Z13.21 ENCOUNTER FOR VITAMIN DEFICIENCY SCREENING: ICD-10-CM

## 2024-05-14 DIAGNOSIS — R33.9 URINARY RETENTION: ICD-10-CM

## 2024-05-14 DIAGNOSIS — R06.02 SHORTNESS OF BREATH: ICD-10-CM

## 2024-05-14 DIAGNOSIS — J45.40 MODERATE PERSISTENT ASTHMA, UNSPECIFIED WHETHER COMPLICATED: ICD-10-CM

## 2024-05-14 DIAGNOSIS — J44.9 CHRONIC OBSTRUCTIVE PULMONARY DISEASE, UNSPECIFIED COPD TYPE: ICD-10-CM

## 2024-05-14 DIAGNOSIS — F41.1 GENERALIZED ANXIETY DISORDER: ICD-10-CM

## 2024-05-14 DIAGNOSIS — Z13.1 SCREENING FOR DIABETES MELLITUS (DM): ICD-10-CM

## 2024-05-14 DIAGNOSIS — R07.9 CHEST PAIN, UNSPECIFIED TYPE: ICD-10-CM

## 2024-05-14 DIAGNOSIS — Z12.31 BREAST CANCER SCREENING BY MAMMOGRAM: ICD-10-CM

## 2024-05-14 LAB
AMPHET+METHAMPHET UR QL: NEGATIVE
AMPHETAMINE INTERNAL CONTROL: ABNORMAL
AMPHETAMINES UR QL: NEGATIVE
BARBITURATE INTERNAL CONTROL: ABNORMAL
BARBITURATES UR QL SCN: NEGATIVE
BENZODIAZ UR QL SCN: NEGATIVE
BENZODIAZEPINE INTERNAL CONTROL: ABNORMAL
BILIRUB BLD-MCNC: NEGATIVE MG/DL
BUPRENORPHINE INTERNAL CONTROL: ABNORMAL
BUPRENORPHINE SERPL-MCNC: POSITIVE NG/ML
CANNABINOIDS SERPL QL: NEGATIVE
CLARITY, POC: CLEAR
COCAINE INTERNAL CONTROL: ABNORMAL
COCAINE UR QL: NEGATIVE
COLOR UR: YELLOW
EDDP INTERNAL CONTROL: ABNORMAL
EDDP UR QL SCN: NEGATIVE
EXPIRATION DATE: ABNORMAL
EXPIRATION DATE: NORMAL
GLUCOSE UR STRIP-MCNC: NEGATIVE MG/DL
KETONES UR QL: NEGATIVE
LEUKOCYTE EST, POC: NEGATIVE
Lab: ABNORMAL
Lab: NORMAL
MDMA (ECSTASY) INTERNAL CONTROL: ABNORMAL
MDMA UR QL SCN: NEGATIVE
METHADONE INTERNAL CONTROL: ABNORMAL
METHADONE UR QL SCN: NEGATIVE
METHAMPHETAMINE INTERNAL CONTROL: ABNORMAL
MORPHINE INTERNAL CONTROL: ABNORMAL
MORPHINE UR QL SCN: NEGATIVE
NITRITE UR-MCNC: NEGATIVE MG/ML
OXYCODONE INTERNAL CONTROL: ABNORMAL
OXYCODONE UR QL SCN: NEGATIVE
PCP UR QL SCN: NEGATIVE
PH UR: 7 [PH] (ref 5–8)
PHENCYCLIDINE INTERNAL CONTROL: ABNORMAL
PROPOXYPH UR QL SCN: NEGATIVE
PROPOXYPHENE INTERNAL CONTROL: ABNORMAL
PROT UR STRIP-MCNC: NEGATIVE MG/DL
RBC # UR STRIP: NEGATIVE /UL
SP GR UR: 1.01 (ref 1–1.03)
THC INTERNAL CONTROL: ABNORMAL
TRICYCLIC ANTIDEPRESSANTS INTERNAL CONTROL: ABNORMAL
TRICYCLICS UR QL SCN: NEGATIVE
UROBILINOGEN UR QL: NORMAL

## 2024-05-14 RX ORDER — GABAPENTIN 800 MG/1
800 TABLET ORAL 4 TIMES DAILY
Qty: 120 TABLET | Refills: 0 | Status: SHIPPED | OUTPATIENT
Start: 2024-05-14

## 2024-05-14 RX ORDER — IBUPROFEN 800 MG/1
800 TABLET ORAL EVERY 8 HOURS PRN
Qty: 90 TABLET | Refills: 1 | Status: SHIPPED | OUTPATIENT
Start: 2024-05-14

## 2024-05-14 RX ORDER — FLUOXETINE HYDROCHLORIDE 20 MG/1
20 CAPSULE ORAL DAILY
Qty: 90 CAPSULE | Refills: 1 | Status: SHIPPED | OUTPATIENT
Start: 2024-05-14

## 2024-05-14 RX ORDER — FLUOXETINE HYDROCHLORIDE 20 MG/1
20 CAPSULE ORAL
COMMUNITY
Start: 2024-05-11 | End: 2024-05-14 | Stop reason: SDUPTHER

## 2024-05-14 RX ORDER — ALBUTEROL SULFATE 90 UG/1
2 AEROSOL, METERED RESPIRATORY (INHALATION) EVERY 4 HOURS PRN
Qty: 8 G | Refills: 1 | Status: SHIPPED | OUTPATIENT
Start: 2024-05-14

## 2024-05-14 RX ORDER — HYDROXYZINE HYDROCHLORIDE 25 MG/1
25 TABLET, FILM COATED ORAL 3 TIMES DAILY PRN
Qty: 30 TABLET | Refills: 2 | Status: SHIPPED | OUTPATIENT
Start: 2024-05-14

## 2024-05-14 RX ORDER — LOSARTAN POTASSIUM 100 MG/1
100 TABLET ORAL DAILY
Qty: 30 TABLET | Refills: 2 | Status: SHIPPED | OUTPATIENT
Start: 2024-05-14

## 2024-05-14 NOTE — ASSESSMENT & PLAN NOTE
Patient report she has been having arm pain in the upper arm, deltoid region, following her COVID vaccination.  She reports this pain is getting worse, occasionally is a sharp pain, occasionally is a tingling sensation.  We discussed treatment options, symptomatic measures.

## 2024-05-14 NOTE — PROGRESS NOTES
"    Annual Physical     Name: Heidi Webb    : 1985     MRN: [id number]     Chief Complaint  Annual Exam (Pt is here for an annual exam ), Urinary Tract Infection (Pt has frequent UTIs and has been having pain when urinating ), Urinary Retention (Pt has been having her bladder full and is not able to empty it all the way ), Back Pain (Pt has been having lower back pain for a while now ), and Arm Pain    Subjective     History of Present Illness:  Heidi Webb is a 38 y.o. female who presents today for a health maintenance evaluation.    Social History  Tobacco Use: High Risk (2024)    Patient History     Smoking Tobacco Use: Every Day     Smokeless Tobacco Use: Never     Passive Exposure: Not on file     Social History     Socioeconomic History    Marital status:    Tobacco Use    Smoking status: Every Day     Current packs/day: 1.00     Average packs/day: 1 pack/day for 25.4 years (25.4 ttl pk-yrs)     Types: Cigarettes     Start date: 1999    Smokeless tobacco: Never   Vaping Use    Vaping status: Never Used   Substance and Sexual Activity    Alcohol use: Never    Drug use: Not Currently     Types: Cocaine(coke), Marijuana, Methamphetamines    Sexual activity: Not Currently     Partners: Male     Birth control/protection: Condom, Same-sex partner       General History  Heidi  does not have regular dental visits.  She does not complain of vision problems. Last eye exam was years ago.  Immunizations are not up to date. The patient needs the following immunizations: Pneumococcal, Tdap, COVID -patient declines all of these today.    Lifestyle  Heidi  consumes in general, an \"unhealthy\" diet.  She exercises never.    Reproductive Health  Heidi  is perimenopausal.  She reports periods are irregular.  She is sexually active. Her contraceptive plan is condoms.    Screening  Last pap was   Last Completed Pap Smear            PAP SMEAR (Every 3 Years) Next due on 2023  " LIQUID-BASED PAP SMEAR WITH HPV GENOTYPING REGARDLESS OF INTERPRETATION (CAMPBELL,COR,MAD)                . History of abnormal pap smear or family history of gyn cancer: personal hx of cervical and ovarian CA - reports they scraped cells and was fine after that, no chemo/radiation. Mom with cervical CA, had partial hysterectomy     Last mammogram was never - wont be 39 until this summer, ordered today  Last Completed Mammogram       This patient has no relevant Health Maintenance data.        . Personal or family history of abnormal mammograms or breast cancer: Maternal aunt with breast cancer    Last colonoscopy was never  Last Completed Colonoscopy       This patient has no relevant Health Maintenance data.        . Family history of colon cancer: Maternal and paternal uncles with colon cancer    **Mom and paternal grandfather both with lung cancer, both were smokers.**    Last DEXA was never.    Health Maintenance Summary            Postponed - COVID-19 Vaccine (2 - 2023-24 season) Postponed until 5/21/2024 05/19/2024  Postponed until 5/21/2024 by Zofia Payne APRN (Patient Refused)    08/24/2023  Postponed until 11/13/2023 by Letitia Landaverde MA (Product Unavailable)    01/21/2023  Imm Admin: COVID-19 (MODERNA) 1st,2nd,3rd Dose Monovalent    10/20/2022  Postponed until 10/22/2022 by Letitia Landaverde MA (Patient Refused)              Postponed - Pneumococcal Vaccine 0-64 (1 of 2 - PCV) Postponed until 5/19/2025 05/19/2024  Postponed until 5/19/2025 by Zofia Payne APRN (Patient Refused)              Postponed - TDAP/TD VACCINES (1 - Tdap) Postponed until 5/19/2025 05/19/2024  Postponed until 5/19/2025 by Zofia Payne APRN (Patient Refused)              INFLUENZA VACCINE (Yearly - August to March) Next due on 8/1/2024 01/21/2023  Imm Admin: Influenza Injectable Mdck Pf Quad              BMI FOLLOWUP (Yearly) Next due on 10/10/2024      10/10/2023  [employer]: WORKFLOW - QUALITY  "MEASUREMENT - DOCUMENTED WEIGHT FOLLOW-UP PLAN              ANNUAL PHYSICAL (Yearly) Next due on 5/14/2025 05/14/2024  Done              PAP SMEAR (Every 3 Years) Next due on 5/11/2026 05/11/2023  LIQUID-BASED PAP SMEAR WITH HPV GENOTYPING REGARDLESS OF INTERPRETATION (CAMPBELL,COR,MAD)              HEPATITIS C SCREENING  Completed      06/14/2022  Hep C Virus Ab component of Hepatitis C antibody                  Immunization History   Administered Date(s) Administered    COVID-19 (MODERNA) 1st,2nd,3rd Dose Monovalent 01/21/2023    Influenza Injectable Mdck Pf Quad 01/21/2023       Review of Systems    Objective     Vital Signs  /90 (BP Location: Left arm, Patient Position: Sitting)   Pulse 77   Ht 160 cm (62.99\")   Wt 81.9 kg (180 lb 8 oz)   SpO2 98%   BMI 31.98 kg/m²   Estimated body mass index is 31.98 kg/m² as calculated from the following:    Height as of this encounter: 160 cm (62.99\").    Weight as of this encounter: 81.9 kg (180 lb 8 oz).     Physical Exam  Vitals reviewed.   Constitutional:       Appearance: Normal appearance.   Cardiovascular:      Rate and Rhythm: Normal rate and regular rhythm.      Heart sounds: Normal heart sounds.   Pulmonary:      Effort: Pulmonary effort is normal.      Breath sounds: Decreased air movement (Throughout all lung fields despite encouragement from APRN to take deep breaths.) present.   Skin:     General: Skin is warm and dry.      Capillary Refill: Capillary refill takes less than 2 seconds.   Neurological:      General: No focal deficit present.      Mental Status: She is alert and oriented to person, place, and time.   Psychiatric:         Mood and Affect: Mood normal.         Behavior: Behavior normal.          Assessment and Plan     Diagnoses and all orders for this visit:    1. Annual physical exam (Primary)  -     CBC & Differential  -     Comprehensive Metabolic Panel  -     Hemoglobin A1c  -     Lipid Panel  -     TSH  -     Vitamin B12  -     " Vitamin D,25-Hydroxy  -     Folate  -     T4, free  -     Mammo Screening Bilateral With CAD; Future    2. Primary hypertension  Assessment & Plan:  Patient is stable on current dose of medication, refills provided today.  She declines increase in medication.  Reports today's elevation in BP is most likely related to her chest pain and shortness of breath.    Orders:  -     losartan (Cozaar) 100 MG tablet; Take 1 tablet by mouth Daily.  Dispense: 30 tablet; Refill: 2    3. Moderate persistent asthma, unspecified whether complicated  Assessment & Plan:  Stable on current dose of medications, refills of albuterol provided.      4. Chronic obstructive pulmonary disease, unspecified COPD type  Assessment & Plan:  Stable on current doses of inhalers, she continues to be an everyday smoker.  Not interested in smoking cessation.      Orders:  -     albuterol sulfate HFA (Ventolin HFA) 108 (90 Base) MCG/ACT inhaler; Inhale 2 puffs Every 4 (Four) Hours As Needed for Wheezing.  Dispense: 8 g; Refill: 1  -     Umeclidinium-Vilanterol (ANORO ELLIPTA) 62.5-25 MCG/ACT aerosol powder  inhaler; Inhale 1 puff Daily.  Dispense: 1 each; Refill: 11    5. Generalized anxiety disorder  Assessment & Plan:  Patient is stable on current dose of medications, refills provided.    Orders:  -     FLUoxetine (PROzac) 20 MG capsule; Take 1 capsule by mouth Daily.  Dispense: 90 capsule; Refill: 1  -     hydrOXYzine (ATARAX) 25 MG tablet; Take 1 tablet by mouth 3 (Three) Times a Day As Needed for Anxiety.  Dispense: 30 tablet; Refill: 2    6. Chronic idiopathic constipation  Assessment & Plan:  Stable on current dose of Linzess, refills provided.    Orders:  -     linaclotide (Linzess) 145 MCG capsule capsule; Take 1 capsule by mouth Every Morning.  Dispense: 90 capsule; Refill: 1    7. Lumbar pain  Assessment & Plan:  Chronic, stable on current dose of medications, refills provided.    Orders:  -     Ambulatory Referral to Neurosurgery  -      gabapentin (Neurontin) 800 MG tablet; Take 1 tablet by mouth 4 (Four) Times a Day. Cancel the 600mg prescription please and the 90 quantity  Dispense: 120 tablet; Refill: 0  -     ibuprofen (ADVIL,MOTRIN) 800 MG tablet; Take 1 tablet by mouth Every 8 (Eight) Hours As Needed for Moderate Pain.  Dispense: 90 tablet; Refill: 1    8. Right arm pain  Assessment & Plan:  Patient report she has been having arm pain in the upper arm, deltoid region, following her COVID vaccination.  She reports this pain is getting worse, occasionally is a sharp pain, occasionally is a tingling sensation.  We discussed treatment options, symptomatic measures.      9. Chest pain, unspecified type  Assessment & Plan:  Patient reports intermittent chest pain and shortness of breath for the last couple of days, is fearful that she has pneumonia.  She declines ECG in office, declines EMS transport to ER despite APRN's encouragement to go to ER.  Patient and her sister argue in the office, following the argument, patient is agreeable to go to ER when sister goes to ER for herself.      10. Shortness of breath  Assessment & Plan:  Patient reports intermittent chest pain and shortness of breath for the last couple of days, is fearful that she has pneumonia.  Chest x-ray ordered for in office, will follow for report from radiologist.  She declines ECG in office, declines EMS transport to ER despite APRN's encouragement to go to ER.  Patient and her sister argue in the office, following the argument, patient is agreeable to go to ER when sister goes to ER for herself.    Orders:  -     XR Chest PA & Lateral (In Office)    11. Urinary retention  Assessment & Plan:  Patient reports she feels that she cannot fully empty her bladder, despite how long she sits on the toilet.  She has intermittent burning when she urinates, UA checked in office today and normal.  We discussed treatment options, patient declines to initiate any treatment at this  time.    Orders:  -     POC Urinalysis Dipstick, Automated    12. Screening for lipid disorders  Assessment & Plan:  Labs drawn today.    Orders:  -     Lipid Panel    13. Screening for diabetes mellitus (DM)  Assessment & Plan:  Labs drawn today.    Orders:  -     Comprehensive Metabolic Panel  -     Hemoglobin A1c    14. Encounter for vitamin deficiency screening  Assessment & Plan:  Labs drawn today.    Orders:  -     TSH  -     Vitamin B12  -     Vitamin D,25-Hydroxy    15. Breast cancer screening by mammogram  Assessment & Plan:  Mammogram ordered today, patient turns 39 this summer.    Orders:  -     Mammo Screening Bilateral With CAD; Future    16. Controlled substance agreement signed  Assessment & Plan:  Urine sample collected, UDS completed.  Mitchell reviewed.    Orders:  -      POC Urine Drug Screen        Follow Up  Return in about 3 months (around 8/14/2024) for Recheck.    Zofia Payne, APRN

## 2024-05-15 ENCOUNTER — TELEPHONE (OUTPATIENT)
Dept: FAMILY MEDICINE CLINIC | Facility: CLINIC | Age: 39
End: 2024-05-15
Payer: MEDICAID

## 2024-05-15 DIAGNOSIS — E55.9 VITAMIN D DEFICIENCY: Primary | ICD-10-CM

## 2024-05-15 LAB
25(OH)D3+25(OH)D2 SERPL-MCNC: 23.5 NG/ML (ref 30–100)
ALBUMIN SERPL-MCNC: 4.5 G/DL (ref 3.9–4.9)
ALBUMIN/GLOB SERPL: 1.8 {RATIO} (ref 1.2–2.2)
ALP SERPL-CCNC: 66 IU/L (ref 44–121)
ALT SERPL-CCNC: 10 IU/L (ref 0–32)
AST SERPL-CCNC: 12 IU/L (ref 0–40)
BASOPHILS # BLD AUTO: 0.1 X10E3/UL (ref 0–0.2)
BASOPHILS NFR BLD AUTO: 1 %
BILIRUB SERPL-MCNC: 0.2 MG/DL (ref 0–1.2)
BUN SERPL-MCNC: 9 MG/DL (ref 6–20)
BUN/CREAT SERPL: 13 (ref 9–23)
CALCIUM SERPL-MCNC: 9.5 MG/DL (ref 8.7–10.2)
CHLORIDE SERPL-SCNC: 102 MMOL/L (ref 96–106)
CHOLEST SERPL-MCNC: 141 MG/DL (ref 100–199)
CO2 SERPL-SCNC: 24 MMOL/L (ref 20–29)
CREAT SERPL-MCNC: 0.71 MG/DL (ref 0.57–1)
EGFRCR SERPLBLD CKD-EPI 2021: 111 ML/MIN/1.73
EOSINOPHIL # BLD AUTO: 0.6 X10E3/UL (ref 0–0.4)
EOSINOPHIL NFR BLD AUTO: 6 %
ERYTHROCYTE [DISTWIDTH] IN BLOOD BY AUTOMATED COUNT: 13.1 % (ref 11.7–15.4)
FOLATE SERPL-MCNC: 9.3 NG/ML
GLOBULIN SER CALC-MCNC: 2.5 G/DL (ref 1.5–4.5)
GLUCOSE SERPL-MCNC: 97 MG/DL (ref 70–99)
HBA1C MFR BLD: 5.7 % (ref 4.8–5.6)
HCT VFR BLD AUTO: 36.2 % (ref 34–46.6)
HDLC SERPL-MCNC: 37 MG/DL
HGB BLD-MCNC: 11.7 G/DL (ref 11.1–15.9)
IMM GRANULOCYTES # BLD AUTO: 0 X10E3/UL (ref 0–0.1)
IMM GRANULOCYTES NFR BLD AUTO: 0 %
LDLC SERPL CALC-MCNC: 86 MG/DL (ref 0–99)
LYMPHOCYTES # BLD AUTO: 2.8 X10E3/UL (ref 0.7–3.1)
LYMPHOCYTES NFR BLD AUTO: 30 %
MCH RBC QN AUTO: 28.8 PG (ref 26.6–33)
MCHC RBC AUTO-ENTMCNC: 32.3 G/DL (ref 31.5–35.7)
MCV RBC AUTO: 89 FL (ref 79–97)
MONOCYTES # BLD AUTO: 0.6 X10E3/UL (ref 0.1–0.9)
MONOCYTES NFR BLD AUTO: 7 %
NEUTROPHILS # BLD AUTO: 5.3 X10E3/UL (ref 1.4–7)
NEUTROPHILS NFR BLD AUTO: 56 %
PLATELET # BLD AUTO: 322 X10E3/UL (ref 150–450)
POTASSIUM SERPL-SCNC: 4.3 MMOL/L (ref 3.5–5.2)
PROT SERPL-MCNC: 7 G/DL (ref 6–8.5)
RBC # BLD AUTO: 4.06 X10E6/UL (ref 3.77–5.28)
SODIUM SERPL-SCNC: 139 MMOL/L (ref 134–144)
T4 FREE SERPL-MCNC: 1.3 NG/DL (ref 0.82–1.77)
TRIGL SERPL-MCNC: 94 MG/DL (ref 0–149)
TSH SERPL DL<=0.005 MIU/L-ACNC: 1.61 UIU/ML (ref 0.45–4.5)
VIT B12 SERPL-MCNC: 621 PG/ML (ref 232–1245)
VLDLC SERPL CALC-MCNC: 18 MG/DL (ref 5–40)
WBC # BLD AUTO: 9.5 X10E3/UL (ref 3.4–10.8)

## 2024-05-15 RX ORDER — MULTIVIT-MIN/IRON/FOLIC ACID/K 18-600-40
2000 CAPSULE ORAL DAILY
Qty: 30 CAPSULE | Refills: 5 | Status: SHIPPED | OUTPATIENT
Start: 2024-05-15

## 2024-05-15 NOTE — TELEPHONE ENCOUNTER
Caller: [place] Heidi    Relationship: Self    Best call back number: [phone]     Caller requesting test results: PATIENT    What test was performed: XRAY CHEST    When was the test performed: 5/14/24    Where was the test performed: [facility]    Additional notes: PATIENT WENT TO ER LAST EVENING, GOT EKG AND ASPIRIN AND BLOOD WORK AND THEN LEFT, WAS REALLY BUSY IN ER.  PHONE CALL PLEASE

## 2024-05-16 NOTE — TELEPHONE ENCOUNTER
Caller: Heidi Webb    Relationship: Self    Best call back number: [phone]     Caller requesting test results: THE PATIENT     What test was performed: EKG, CHEST X-RAY, AND LABS     When was the test performed: 05/14/2024    Where was the test performed: CHEST X-RAY AND LABS AT THE [facility] AND EKG AT [facility]     Additional notes: PLEASE CALL AND ADVISE.

## 2024-05-16 NOTE — TELEPHONE ENCOUNTER
Attempted to contact Pt about this  LVM for Pt to call us back     HUB TO RELAY     Pt is needing an ER follow up appt in order for Zofia to go over [facility] labs and X-Rays

## 2024-05-17 ENCOUNTER — TELEPHONE (OUTPATIENT)
Dept: FAMILY MEDICINE CLINIC | Facility: CLINIC | Age: 39
End: 2024-05-17
Payer: MEDICAID

## 2024-05-17 NOTE — TELEPHONE ENCOUNTER
"LVM for Pt to call us back     HUB TO RELAY     Zofia Payne, APRN  5/15/2024  8:29 PM EDT Back to Top      Please let Heidi know we got her lab results and xray report back - I accidentally closed out of her xray report before I was able to write a note on it.... her chest xray was normal. No signs of pneumonia or fluid in/around the lungs.     Her blood counts look good with the exception of an elevated eosinophil count, which just means she is having a flare of allergy symptoms. This could be part of the cause for her shortness of breath.     Her HgA1c is back down from 5.9 to 5.7 - keep up the great work with glucose control!     Her cholesterol levels look great with the exception of a low \"good\" cholesterol level. The best way to improve this is by exercising 3-4 times per week.     Her vitamin D level is just a little bit low - I've sent a supplement to the pharmacy for this.     The rest of her labs are all normal - electrolytes, liver, kidneys, thyroid, vitamins B12 and folate.     "

## 2024-05-19 PROBLEM — R07.9 CHEST PAIN: Status: ACTIVE | Noted: 2024-05-19

## 2024-05-19 PROBLEM — F41.1 GENERALIZED ANXIETY DISORDER: Status: ACTIVE | Noted: 2024-05-19

## 2024-05-19 PROBLEM — J45.40 MODERATE PERSISTENT ASTHMA: Status: ACTIVE | Noted: 2024-05-19

## 2024-05-19 PROBLEM — R06.02 SHORTNESS OF BREATH: Status: ACTIVE | Noted: 2024-05-19

## 2024-05-19 PROBLEM — Z13.21 ENCOUNTER FOR VITAMIN DEFICIENCY SCREENING: Status: ACTIVE | Noted: 2024-05-19

## 2024-05-19 PROBLEM — K59.04 CHRONIC IDIOPATHIC CONSTIPATION: Status: ACTIVE | Noted: 2024-05-19

## 2024-05-19 PROBLEM — M54.50 LUMBAR PAIN: Status: ACTIVE | Noted: 2024-05-19

## 2024-05-19 PROBLEM — J44.9 CHRONIC OBSTRUCTIVE PULMONARY DISEASE: Status: ACTIVE | Noted: 2024-05-19

## 2024-05-19 PROBLEM — Z12.31 BREAST CANCER SCREENING BY MAMMOGRAM: Status: ACTIVE | Noted: 2024-05-19

## 2024-05-19 PROBLEM — Z13.1 SCREENING FOR DIABETES MELLITUS (DM): Status: ACTIVE | Noted: 2024-05-19

## 2024-05-19 PROBLEM — I10 PRIMARY HYPERTENSION: Status: ACTIVE | Noted: 2024-05-19

## 2024-05-19 PROBLEM — Z13.220 SCREENING FOR LIPID DISORDERS: Status: ACTIVE | Noted: 2024-05-19

## 2024-05-19 NOTE — ASSESSMENT & PLAN NOTE
Patient is stable on current dose of medication, refills provided today.  She declines increase in medication.  Reports today's elevation in BP is most likely related to her chest pain and shortness of breath.

## 2024-05-19 NOTE — ASSESSMENT & PLAN NOTE
Patient reports intermittent chest pain and shortness of breath for the last couple of days, is fearful that she has pneumonia.  Chest x-ray ordered for in office, will follow for report from radiologist.  She declines ECG in office, declines EMS transport to ER despite APRN's encouragement to go to ER.  Patient and her sister argue in the office, following the argument, patient is agreeable to go to ER when sister goes to ER for herself.

## 2024-05-19 NOTE — ASSESSMENT & PLAN NOTE
Patient reports intermittent chest pain and shortness of breath for the last couple of days, is fearful that she has pneumonia.  She declines ECG in office, declines EMS transport to ER despite APRN's encouragement to go to ER.  Patient and her sister argue in the office, following the argument, patient is agreeable to go to ER when sister goes to ER for herself.

## 2024-05-19 NOTE — ASSESSMENT & PLAN NOTE
Stable on current doses of inhalers, she continues to be an everyday smoker.  Not interested in smoking cessation.

## 2024-05-19 NOTE — ASSESSMENT & PLAN NOTE
Patient reports she feels that she cannot fully empty her bladder, despite how long she sits on the toilet.  She has intermittent burning when she urinates, UA checked in office today and normal.  We discussed treatment options, patient declines to initiate any treatment at this time.

## 2024-05-21 NOTE — TELEPHONE ENCOUNTER
Attempted to contact Pt about this  LVM for Pt to call us back      HUB TO RELAY      Pt is needing an ER follow up appt in order for Zofia to go over [facility] labs and X-

## 2024-05-22 ENCOUNTER — PATIENT MESSAGE (OUTPATIENT)
Dept: FAMILY MEDICINE CLINIC | Facility: CLINIC | Age: 39
End: 2024-05-22

## 2024-05-22 DIAGNOSIS — F41.1 GENERALIZED ANXIETY DISORDER: ICD-10-CM

## 2024-05-22 DIAGNOSIS — J44.9 CHRONIC OBSTRUCTIVE PULMONARY DISEASE, UNSPECIFIED COPD TYPE: ICD-10-CM

## 2024-05-22 DIAGNOSIS — E55.9 VITAMIN D DEFICIENCY: ICD-10-CM

## 2024-05-22 DIAGNOSIS — I10 PRIMARY HYPERTENSION: ICD-10-CM

## 2024-05-22 DIAGNOSIS — K59.04 CHRONIC IDIOPATHIC CONSTIPATION: ICD-10-CM

## 2024-05-22 DIAGNOSIS — M54.50 LUMBAR PAIN: ICD-10-CM

## 2024-05-22 RX ORDER — GABAPENTIN 800 MG/1
800 TABLET ORAL 4 TIMES DAILY
Qty: 120 TABLET | Refills: 0 | OUTPATIENT
Start: 2024-05-22

## 2024-05-22 NOTE — TELEPHONE ENCOUNTER
DELETE AFTER REVIEWING: Telephone encounter to be sent to the clinical pool    Name: Heidi Webb    Relationship: Self    Best Callback Number: [phone]     HUB PROVIDED THE RELAY MESSAGE FROM THE OFFICE   PATIENT HAS FURTHER QUESTIONS AND WOULD LIKE A CALL BACK AT THE FOLLOWING PHONE NUMBER [phone]    ADDITIONAL INFORMATION: THE PATIENT IS REQUESTING A CALL BACK WITH RESULTS OF HER EKG.

## 2024-05-22 NOTE — TELEPHONE ENCOUNTER
"    Caller: Heidi Webb    Relationship: Self    Best call back number: [phone]     Requested Prescriptions:   Requested Prescriptions     Pending Prescriptions Disp Refills    gabapentin (Neurontin) 800 MG tablet 120 tablet 0     Sig: Take 1 tablet by mouth 4 (Four) Times a Day. Cancel the 600mg prescription please and the 90 quantity        Pharmacy where request should be sent: [id number] - [place], [place] [address]  [address] [facility] [phone] [facility] [phone] FX     Last office visit with prescribing clinician: 5/14/2024   Last telemedicine visit with prescribing clinician: 1/25/2024   Next office visit with prescribing clinician: Visit date not found     Additional details provided by patient: ADVISED SHE IS NOW \"LOCKED IN AT [facility]\". SO , SHE NEEDS TO LET HER PCP KNOW OF THIS PHARMACY CHANGE.    Does the patient have less than a 3 day supply:  [x] Yes  [] No    Would you like a call back once the refill request has been completed: [] Yes [x] No    If the office needs to give you a call back, can they leave a voicemail: [] Yes [x] No    Loraine Bomwan Rep   05/22/24 13:12 EDT               "

## 2024-05-22 NOTE — TELEPHONE ENCOUNTER
I don't see anything in her chart. I will have someone get on the [facility] site and see what we can get.

## 2024-05-30 ENCOUNTER — TELEPHONE (OUTPATIENT)
Dept: FAMILY MEDICINE CLINIC | Facility: CLINIC | Age: 39
End: 2024-05-30
Payer: MEDICAID

## 2024-05-30 DIAGNOSIS — M54.50 LUMBAR PAIN: ICD-10-CM

## 2024-05-30 RX ORDER — GABAPENTIN 800 MG/1
800 TABLET ORAL 4 TIMES DAILY
Qty: 120 TABLET | Refills: 0 | Status: CANCELLED | OUTPATIENT
Start: 2024-05-30

## 2024-05-30 RX ORDER — GABAPENTIN 800 MG/1
800 TABLET ORAL 4 TIMES DAILY
Qty: 120 TABLET | Refills: 0 | Status: SHIPPED | OUTPATIENT
Start: 2024-05-30

## 2024-05-30 NOTE — TELEPHONE ENCOUNTER
I called and spoke with Louie at [facility] and he confirmed that patient did not  the medication.

## 2024-05-30 NOTE — TELEPHONE ENCOUNTER
Caller: Heidi Webb    Relationship: Self    Best call back number:  [phone]    Requested Prescriptions: PHARMACY CHANGE  Requested Prescriptions     Pending Prescriptions Disp Refills    gabapentin (Neurontin) 800 MG tablet 120 tablet 0     Sig: Take 1 tablet by mouth 4 (Four) Times a Day. Cancel the 600mg prescription please and the 90 quantity        Pharmacy where request should be sent: [id number] - [place], [place] [address]  & [address] [facility] [phone] [facility] [phone] FX     Last office visit with prescribing clinician: 5/14/2024   Last telemedicine visit with prescribing clinician: 1/25/2024   Next office visit with prescribing clinician: Visit date not found     Additional details provided by patient: PLEASE CANCEL THE ORDER AT [facility] [place] AND SEND TO THE [facility]'S ABOVE.   PATIENT DID NOT PICK THAT UP AT [facility] LAST FILL.    Does the patient have less than a 3 day supply:  [x] Yes  [] No    Would you like a call back once the refill request has been completed: [x] Yes [] No    If the office needs to give you a call back, can they leave a voicemail: [x] Yes [] No    Loraine Bwoman Rep   05/30/24 08:31 EDT

## 2024-06-24 DIAGNOSIS — M54.50 LUMBAR PAIN: ICD-10-CM

## 2024-06-24 NOTE — TELEPHONE ENCOUNTER
Caller: Heidi Webb    Relationship: Self    Best call back number: [phone]     Requested Prescriptions:   Requested Prescriptions     Pending Prescriptions Disp Refills    gabapentin (Neurontin) 800 MG tablet 120 tablet 0     Sig: Take 1 tablet by mouth 4 (Four) Times a Day. Cancel the 600mg prescription please and the 90 quantity        Pharmacy where request should be sent: [id number], [place] - [address]  [address] [facility] [phone] [facility] [phone] FX     Last office visit with prescribing clinician: 5/14/2024   Last telemedicine visit with prescribing clinician: 1/25/2024   Next office visit with prescribing clinician: Visit date not found     Additional details provided by patient: PATIENT WANTED TO CALL THESE IN AHEAD OF TIME      Does the patient have less than a 3 day supply:  [] Yes  [x] No    Would you like a call back once the refill request has been completed: [] Yes [x] No    If the office needs to give you a call back, can they leave a voicemail: [] Yes [x] No    Loraine Carcamo Rep   06/24/24 14:21 EDT

## 2024-06-25 RX ORDER — GABAPENTIN 800 MG/1
800 TABLET ORAL 4 TIMES DAILY
Qty: 120 TABLET | Refills: 0 | Status: SHIPPED | OUTPATIENT
Start: 2024-06-25

## 2024-06-25 NOTE — TELEPHONE ENCOUNTER
Patient will be due for med recheck for controlled substances around 8/14/24 (3 months from last visit) - this has not been scheduled yet.     Also, I can only authorize 1 more refill between now and then. She is requesting refills too soon!!

## 2024-07-03 DIAGNOSIS — I10 PRIMARY HYPERTENSION: ICD-10-CM

## 2024-07-05 RX ORDER — LOSARTAN POTASSIUM 100 MG/1
100 TABLET ORAL DAILY
Qty: 30 TABLET | Refills: 2 | OUTPATIENT
Start: 2024-07-05

## 2024-07-17 DIAGNOSIS — J44.9 CHRONIC OBSTRUCTIVE PULMONARY DISEASE, UNSPECIFIED COPD TYPE: ICD-10-CM

## 2024-07-17 NOTE — TELEPHONE ENCOUNTER
Caller: Heidi Webb    Relationship: Self    Best call back number: [phone]     Requested Prescriptions:   Requested Prescriptions     Pending Prescriptions Disp Refills    albuterol sulfate HFA (Ventolin HFA) 108 (90 Base) MCG/ACT inhaler 8 g 1     Sig: Inhale 2 puffs Every 4 (Four) Hours As Needed for Wheezing.        Pharmacy where request should be sent: [address] [phone] [facility] [phone]      Last office visit with prescribing clinician: 5/14/2024   Last telemedicine visit with prescribing clinician: 1/25/2024   Next office visit with prescribing clinician: Visit date not found     Additional details provided by patient: PATIENT STATED THAT SHE NEEDS THE GREY OR BLUE INHALER  SHE IS UNABLE TO USE THE RED DUE TO IT MAKING HER HAVE THE SHAKES.     Does the patient have less than a 3 day supply:  [x] Yes  [] No    Would you like a call back once the refill request has been completed: [x] Yes [] No    If the office needs to give you a call back, can they leave a voicemail: [x] Yes [] No    Loraine Oconnor Rep   07/17/24 13:05 EDT

## 2024-07-18 RX ORDER — ALBUTEROL SULFATE 90 UG/1
2 AEROSOL, METERED RESPIRATORY (INHALATION) EVERY 4 HOURS PRN
Qty: 8 G | Refills: 1 | Status: SHIPPED | OUTPATIENT
Start: 2024-07-18

## 2024-07-24 DIAGNOSIS — F33.9 DEPRESSION, RECURRENT: ICD-10-CM

## 2024-07-24 RX ORDER — ESCITALOPRAM OXALATE 10 MG/1
10 TABLET ORAL DAILY
Qty: 90 TABLET | Refills: 1 | OUTPATIENT
Start: 2024-07-24

## 2024-07-31 ENCOUNTER — OFFICE VISIT (OUTPATIENT)
Dept: FAMILY MEDICINE CLINIC | Facility: CLINIC | Age: 39
End: 2024-07-31
Payer: COMMERCIAL

## 2024-07-31 VITALS
SYSTOLIC BLOOD PRESSURE: 140 MMHG | HEART RATE: 73 BPM | HEIGHT: 63 IN | OXYGEN SATURATION: 96 % | BODY MASS INDEX: 30.83 KG/M2 | WEIGHT: 174 LBS | DIASTOLIC BLOOD PRESSURE: 100 MMHG

## 2024-07-31 DIAGNOSIS — I10 PRIMARY HYPERTENSION: ICD-10-CM

## 2024-07-31 DIAGNOSIS — R10.2 SUPRAPUBIC PRESSURE: Primary | ICD-10-CM

## 2024-07-31 DIAGNOSIS — J44.9 CHRONIC OBSTRUCTIVE PULMONARY DISEASE, UNSPECIFIED COPD TYPE: ICD-10-CM

## 2024-07-31 DIAGNOSIS — M54.50 LUMBAR PAIN: ICD-10-CM

## 2024-07-31 LAB
BILIRUB BLD-MCNC: NEGATIVE MG/DL
CLARITY, POC: CLEAR
COLOR UR: YELLOW
EXPIRATION DATE: ABNORMAL
GLUCOSE UR STRIP-MCNC: NEGATIVE MG/DL
KETONES UR QL: NEGATIVE
LEUKOCYTE EST, POC: NEGATIVE
Lab: ABNORMAL
NITRITE UR-MCNC: NEGATIVE MG/ML
PH UR: 6.5 [PH] (ref 5–8)
PROT UR STRIP-MCNC: ABNORMAL MG/DL
RBC # UR STRIP: NEGATIVE /UL
SP GR UR: 1.03 (ref 1–1.03)
UROBILINOGEN UR QL: ABNORMAL

## 2024-07-31 PROCEDURE — 3077F SYST BP >= 140 MM HG: CPT | Performed by: NURSE PRACTITIONER

## 2024-07-31 PROCEDURE — 1159F MED LIST DOCD IN RCRD: CPT | Performed by: NURSE PRACTITIONER

## 2024-07-31 PROCEDURE — 3080F DIAST BP >= 90 MM HG: CPT | Performed by: NURSE PRACTITIONER

## 2024-07-31 PROCEDURE — 1160F RVW MEDS BY RX/DR IN RCRD: CPT | Performed by: NURSE PRACTITIONER

## 2024-07-31 PROCEDURE — 99214 OFFICE O/P EST MOD 30 MIN: CPT | Performed by: NURSE PRACTITIONER

## 2024-07-31 RX ORDER — GABAPENTIN 800 MG/1
800 TABLET ORAL 4 TIMES DAILY
Qty: 120 TABLET | Refills: 0 | Status: SHIPPED | OUTPATIENT
Start: 2024-07-31

## 2024-07-31 RX ORDER — ALBUTEROL SULFATE 90 UG/1
2 AEROSOL, METERED RESPIRATORY (INHALATION) EVERY 4 HOURS PRN
Qty: 8 G | Refills: 1 | Status: SHIPPED | OUTPATIENT
Start: 2024-07-31

## 2024-07-31 RX ORDER — LOSARTAN POTASSIUM AND HYDROCHLOROTHIAZIDE 25; 100 MG/1; MG/1
1 TABLET ORAL DAILY
Qty: 30 TABLET | Refills: 1 | Status: SHIPPED | OUTPATIENT
Start: 2024-07-31

## 2024-07-31 NOTE — PROGRESS NOTES
Office Note     Name: Heidi Webb    : 1985     MRN: [id number]     Chief Complaint  Leg Pain (Pt is here today for a recheck on her gabapentin and a refill ), Hypertension (Pt states she has bene having high Bp reading lately, going from 160/100 ), Urinary Tract Infection (Pt states she has been having burning when urinating as well as discomfort ), and Difficulty Urinating (Pt states she has to push when urinating at times)    Subjective     History of Present Illness:  Heidi Webb is a 39 y.o. female who presents today for complaints of pressure when she voids. She reports it feels like she needs to push, like the sensation of when giving birth, when she is trying to void. She denies burning, denies blood in urine. She reports she took a leftover antibiotic (she thinks it was Doxycycline) and 2 Azo tablets on Monday night. She has seen no improvement in her symptoms since taking these medications. She does reports she has a uterine fibroid, questions is this might be that cause of her symptoms - she has called her OB-GYN for an appointment but she wasn't able to schedule until 2024.     Patient complains that her BP has been elevated for a while; has also been experiencing swelling in her feet and ankles. Reports BP at home and hospital has been running 160's/100's. BP in office today is 140/100.     She is in need of refills of her Gabapentin and albuterol inhaler today.       Objective     Past Medical History:   Diagnosis Date    Anxiety     Arthritis     Im not sure it should be in the chart    Asthma     Use inhaler when needed    Cardiac arrhythmia     apparently    COPD (chronic obstructive pulmonary disease)     Anisa told me last i was in there    Depression 2020    Heart failure     Hypertension 202     Past Surgical History:   Procedure Laterality Date    TUBAL ABDOMINAL LIGATION       Family History   Problem Relation Age of Onset    Diabetes Mother     Migraines  "Mother     Stroke Mother     Depression Father     Alcohol abuse Father     Depression Brother     Diabetes Other     Learning disabilities Other     ADD / ADHD Other     Cancer Other     Allergies Other     Migraines Other     Asthma Other     Other Other         CVA, blood disease    Seizures Other     Stroke Paternal Grandfather     Diabetes Paternal Grandmother     Stroke Maternal Uncle        Vital Signs  /100 (BP Location: Left arm, Patient Position: Sitting)   Pulse 73   Ht 160 cm (62.99\")   Wt 78.9 kg (174 lb)   SpO2 96%   BMI 30.83 kg/m²   Estimated body mass index is 30.83 kg/m² as calculated from the following:    Height as of this encounter: 160 cm (62.99\").    Weight as of this encounter: 78.9 kg (174 lb).    Physical Exam  Vitals reviewed.   Constitutional:       Appearance: Normal appearance.   Cardiovascular:      Rate and Rhythm: Normal rate and regular rhythm.      Heart sounds: Normal heart sounds.   Pulmonary:      Effort: Pulmonary effort is normal.      Breath sounds: Normal breath sounds.   Skin:     General: Skin is warm and dry.      Capillary Refill: Capillary refill takes less than 2 seconds.   Neurological:      General: No focal deficit present.      Mental Status: She is alert and oriented to person, place, and time.   Psychiatric:         Mood and Affect: Mood normal.         Behavior: Behavior normal.          POCT Results (if applicable):  Results for orders placed or performed in visit on 07/31/24   POC Urinalysis Dipstick    Specimen: Urine   Result Value Ref Range    Color Yellow Yellow, Straw, Dark Yellow, [place]    Clarity, UA Clear Clear    Glucose, UA Negative Negative mg/dL    Bilirubin Negative Negative    Ketones, UA Negative Negative    Specific Gravity  1.030 1.005 - 1.030    Blood, UA Negative Negative    pH, Urine 6.5 5.0 - 8.0    Protein, POC 1+ (A) Negative mg/dL    Urobilinogen, UA 0.2 E.U./dL Normal, 0.2 E.U./dL    Leukocytes Negative Negative    Nitrite, " UA Negative Negative    Expiration Date 5/31/2025     Lot Number [id number]             Assessment and Plan     Diagnoses and all orders for this visit:    1. Suprapubic pressure (Primary)  -     POC Urinalysis Dipstick    2. Lumbar pain  -     gabapentin (Neurontin) 800 MG tablet; Take 1 tablet by mouth 4 (Four) Times a Day.  Dispense: 120 tablet; Refill: 0    3. Primary hypertension  -     losartan-hydrochlorothiazide (Hyzaar) 100-25 MG per tablet; Take 1 tablet by mouth Daily.  Dispense: 30 tablet; Refill: 1    4. Chronic obstructive pulmonary disease, unspecified COPD type  -     albuterol sulfate HFA (Ventolin HFA) 108 (90 Base) MCG/ACT inhaler; Inhale 2 puffs Every 4 (Four) Hours As Needed for Wheezing.  Dispense: 8 g; Refill: 1      UA checked in office - no signs of infection. Encouraged patient to contact GYN office and request to be placed on cancellation list for sooner appointment.     Mitchell reviewed. Controlled substance agreement signed and current. UDS current and appropriate. Potential side effects discussed including dependence, sedation, increased risk of falls. Follow up in 3 months.    Refill provided for albuterol inhaler.     Blood pressure uncontrolled on current medications. Discussed adding diuretic to current medication versus adding different type of antihypertensive - patient would like to initiate diuretic due to edema she experiences. Discussed that patient will need to continue to monitor blood pressure at home 3-4 times per week. Discussed appropriate measurement technique. Will see back in 1 month to reevaluate blood pressure at that time. Discussed return precautions including change of vision, chest pain, SOA, worsening edema, or continued elevated blood pressure readings greater than 170/90.       Follow Up  Return in about 3 months (around 10/31/2024) for Recheck on Gabapentin.    MARIANGEL Davison

## 2024-08-13 ENCOUNTER — TELEPHONE (OUTPATIENT)
Dept: FAMILY MEDICINE CLINIC | Facility: CLINIC | Age: 39
End: 2024-08-13
Payer: COMMERCIAL

## 2024-08-13 NOTE — TELEPHONE ENCOUNTER
Caller: Heidi Webb    Relationship: Self    Best call back number:      [phone] (Mobile)     What is the best time to reach you:     ANY TIME    Who are you requesting to speak with (clinical staff, provider,  specific staff member):     EARL CRISTINA OR NURSE    What was the call regarding:     PATIENT STATED SHE HAD AN EMERGENCY ROOM VISIT AT [facility] (3) DAYS AGO    PATIENT STATED SHE HAD A DIAGNOSIS OF AN ARTERY IN HER NECK IS INFLAMED, AND PATIENT REQUESTED THE CALL BACK FOR EXPLANATION OF WHAT THIS DIAGNOSIS MEANS

## 2024-08-14 ENCOUNTER — TELEPHONE (OUTPATIENT)
Dept: FAMILY MEDICINE CLINIC | Facility: CLINIC | Age: 39
End: 2024-08-14
Payer: COMMERCIAL

## 2024-08-14 NOTE — TELEPHONE ENCOUNTER
Provider: EARL CRISTINA    Caller: JOE SANDHU        Phone Number: [phone]     Reason for Call: PATIENT WANTED TO LET PROVIDER KNOW THAT WAS WAS BACK IN THE ER LAST NIGHT AND WILL BE AT HER APT ON FRIDAY.

## 2024-08-23 DIAGNOSIS — M54.50 LUMBAR PAIN: ICD-10-CM

## 2024-08-23 DIAGNOSIS — K59.04 CHRONIC IDIOPATHIC CONSTIPATION: ICD-10-CM

## 2024-08-23 NOTE — TELEPHONE ENCOUNTER
Caller: Heidi Webb    Relationship: Self    Best call back number: [phone]    Requested Prescriptions:   Requested Prescriptions     Pending Prescriptions Disp Refills    ibuprofen (ADVIL,MOTRIN) 800 MG tablet 90 tablet 1     Sig: Take 1 tablet by mouth Every 8 (Eight) Hours As Needed for Moderate Pain.    gabapentin (Neurontin) 800 MG tablet 120 tablet 0     Sig: Take 1 tablet by mouth 4 (Four) Times a Day.    linaclotide (Linzess) 145 MCG capsule capsule 90 capsule 1     Sig: Take 1 capsule by mouth Every Morning.        Pharmacy where request should be sent: [facility] [id number] [place], [place] - [address] [facility] [address] - [phone] [facility] [phone] FX     Last office visit with prescribing clinician: 7/31/2024   Last telemedicine visit with prescribing clinician: Visit date not found   Next office visit with prescribing clinician: 10/28/2024     PT STATED THAT RX LINZESS NEEDS A PA.    Does the patient have less than a 3 day supply:  [x] Yes  [] No    Would you like a call back once the refill request has been completed: [] Yes [x] No    If the office needs to give you a call back, can they leave a voicemail: [] Yes [x] No    Loraine Bynum   08/23/24 16:25 EDT           Call negative result of urine culture

## 2024-08-26 RX ORDER — GABAPENTIN 800 MG/1
800 TABLET ORAL 4 TIMES DAILY
Qty: 120 TABLET | Refills: 0 | Status: SHIPPED | OUTPATIENT
Start: 2024-08-26

## 2024-08-26 RX ORDER — IBUPROFEN 800 MG/1
800 TABLET, FILM COATED ORAL EVERY 8 HOURS PRN
Qty: 90 TABLET | Refills: 1 | Status: SHIPPED | OUTPATIENT
Start: 2024-08-26

## 2024-09-09 ENCOUNTER — TELEPHONE (OUTPATIENT)
Dept: FAMILY MEDICINE CLINIC | Facility: CLINIC | Age: 39
End: 2024-09-09
Payer: COMMERCIAL

## 2024-09-09 NOTE — TELEPHONE ENCOUNTER
Caller: Heidi Webb    Relationship: Self    Best call back number:[phone]    Which medication are you concerned about: PT CALLED TO CHECK STATUS FOR RX LIZNESS PA.

## 2024-09-11 DIAGNOSIS — J44.9 CHRONIC OBSTRUCTIVE PULMONARY DISEASE, UNSPECIFIED COPD TYPE: ICD-10-CM

## 2024-09-12 RX ORDER — ALBUTEROL SULFATE 90 UG/1
2 AEROSOL, METERED RESPIRATORY (INHALATION) EVERY 4 HOURS PRN
Qty: 18 G | Refills: 1 | Status: SHIPPED | OUTPATIENT
Start: 2024-09-12

## 2024-09-21 DIAGNOSIS — I10 PRIMARY HYPERTENSION: ICD-10-CM

## 2024-09-21 DIAGNOSIS — M54.50 LUMBAR PAIN: ICD-10-CM

## 2024-09-23 RX ORDER — LOSARTAN POTASSIUM AND HYDROCHLOROTHIAZIDE 25; 100 MG/1; MG/1
1 TABLET ORAL DAILY
Qty: 90 TABLET | Refills: 1 | Status: SHIPPED | OUTPATIENT
Start: 2024-09-23

## 2024-09-23 RX ORDER — GABAPENTIN 800 MG/1
800 TABLET ORAL 4 TIMES DAILY
Qty: 120 TABLET | Refills: 0 | Status: SHIPPED | OUTPATIENT
Start: 2024-09-23

## 2024-10-21 DIAGNOSIS — M54.50 LUMBAR PAIN: ICD-10-CM

## 2024-10-21 DIAGNOSIS — J44.9 CHRONIC OBSTRUCTIVE PULMONARY DISEASE, UNSPECIFIED COPD TYPE: ICD-10-CM

## 2024-10-21 DIAGNOSIS — K59.04 CHRONIC IDIOPATHIC CONSTIPATION: ICD-10-CM

## 2024-10-21 RX ORDER — GABAPENTIN 800 MG/1
800 TABLET ORAL 4 TIMES DAILY
Qty: 28 TABLET | Refills: 0 | Status: SHIPPED | OUTPATIENT
Start: 2024-10-21 | End: 2024-10-28 | Stop reason: SDUPTHER

## 2024-10-21 RX ORDER — ALBUTEROL SULFATE 90 UG/1
2 INHALANT RESPIRATORY (INHALATION) EVERY 4 HOURS PRN
Qty: 18 G | Refills: 1 | Status: SHIPPED | OUTPATIENT
Start: 2024-10-21

## 2024-10-21 NOTE — TELEPHONE ENCOUNTER
Caller: Heidi Webb    Relationship: Self    Best call back number: [phone]     Requested Prescriptions:   Requested Prescriptions     Pending Prescriptions Disp Refills    linaclotide (Linzess) 145 MCG capsule capsule 90 capsule 1     Sig: Take 1 capsule by mouth Every Morning.    gabapentin (Neurontin) 800 MG tablet 120 tablet 0     Sig: Take 1 tablet by mouth 4 (Four) Times a Day.    albuterol sulfate HFA (Ventolin HFA) 108 (90 Base) MCG/ACT inhaler 18 g 1     Si puffs Every 4 (Four) Hours As Needed for Wheezing.        Pharmacy where request should be sent: [facility] [id number] - [place], [place] - [address] [facility] [address] - [phone] [facility] [phone] FX     Last office visit with prescribing clinician: 2024   Last telemedicine visit with prescribing clinician: Visit date not found   Next office visit with prescribing clinician: 10/21/2024     Additional details provided by patient: OUT OF MEDICATION AND STUCK AT ANOTHER DR APPOINTMENT     Does the patient have less than a 3 day supply:  [x] Yes  [] No    Would you like a call back once the refill request has been completed: [] Yes [x] No    If the office needs to give you a call back, can they leave a voicemail: [] Yes [x] No    Loraine Kelley Rep   10/21/24 09:34 EDT

## 2024-10-21 NOTE — TELEPHONE ENCOUNTER
She no-showed her appointment this morning for Gabapentin refill. It appears she is now scheduled 10/28/24 at 11AM. I am sending 1-week worth of medication to get her to that appointment.... cannot give more until she is seen in-office!!

## 2024-10-28 ENCOUNTER — OFFICE VISIT (OUTPATIENT)
Dept: FAMILY MEDICINE CLINIC | Facility: CLINIC | Age: 39
End: 2024-10-28
Payer: COMMERCIAL

## 2024-10-28 VITALS
HEART RATE: 71 BPM | DIASTOLIC BLOOD PRESSURE: 80 MMHG | WEIGHT: 179 LBS | HEIGHT: 63 IN | SYSTOLIC BLOOD PRESSURE: 128 MMHG | OXYGEN SATURATION: 99 % | BODY MASS INDEX: 31.71 KG/M2

## 2024-10-28 DIAGNOSIS — R73.03 PREDIABETES: ICD-10-CM

## 2024-10-28 DIAGNOSIS — F33.9 DEPRESSION, RECURRENT: ICD-10-CM

## 2024-10-28 DIAGNOSIS — M53.86 SCIATICA ASSOCIATED WITH DISORDER OF LUMBAR SPINE: Primary | ICD-10-CM

## 2024-10-28 PROBLEM — E66.811 OBESITY (BMI 30.0-34.9): Status: ACTIVE | Noted: 2024-10-28

## 2024-10-28 PROCEDURE — 99214 OFFICE O/P EST MOD 30 MIN: CPT | Performed by: NURSE PRACTITIONER

## 2024-10-28 PROCEDURE — 1160F RVW MEDS BY RX/DR IN RCRD: CPT | Performed by: NURSE PRACTITIONER

## 2024-10-28 PROCEDURE — 3074F SYST BP LT 130 MM HG: CPT | Performed by: NURSE PRACTITIONER

## 2024-10-28 PROCEDURE — 3079F DIAST BP 80-89 MM HG: CPT | Performed by: NURSE PRACTITIONER

## 2024-10-28 PROCEDURE — 1159F MED LIST DOCD IN RCRD: CPT | Performed by: NURSE PRACTITIONER

## 2024-10-28 PROCEDURE — 3044F HG A1C LEVEL LT 7.0%: CPT | Performed by: NURSE PRACTITIONER

## 2024-10-28 RX ORDER — GABAPENTIN 800 MG/1
800 TABLET ORAL 4 TIMES DAILY
Qty: 120 TABLET | Refills: 0 | Status: SHIPPED | OUTPATIENT
Start: 2024-10-28

## 2024-10-28 RX ORDER — FLUOXETINE 40 MG/1
40 CAPSULE ORAL DAILY
Qty: 30 CAPSULE | Refills: 1 | Status: SHIPPED | OUTPATIENT
Start: 2024-10-28

## 2024-10-28 NOTE — PROGRESS NOTES
Office Note     Name: Heidi Webb    : 1985     MRN: [id number]     Chief Complaint  Med Refill (Pt is here for a recheck and refill on meds ), Back Pain (Pt states her back pain has gotten worse and is wanting a new referral ), and Hot Flashes (Pt has been getting hot flashes and would like something for it, Pt states she is going through early menopause )    Subjective     History of Present Illness:  Heidi Webb is a 39 y.o. female who presents today for medication refills. She also has medical complaints to discuss.     History of Present Illness  The patient is a 39-year-old female who presents for medication refills.    She is seeking a refill of her medications and is also requesting a prescription for hot flashes. She has not recently seen her gynecologist due to rescheduling an appointment.    She is not currently taking Prozac regularly but has been using it intermittently, approximately every two days, when she feels her depression worsening.    She has been experiencing back pain, which is more severe on the right side at times and centralized at other times. The pain often radiates across her lower back, down to her hips, and is accompanied by tingling sensations in both legs.    She was previously diagnosed as prediabetic.    SOCIAL HISTORY  She is a smoker.      Objective     Past Medical History:   Diagnosis Date    Anxiety     Arthritis     Im not sure it should be in the chart    Asthma 2020    Use inhaler when needed    Cardiac arrhythmia     apparently    COPD (chronic obstructive pulmonary disease)     Anisa told me last i was in there    Depression 2020    Headache     Since u was little iv had migraines    Heart failure     History of medical problems     I [place] a heart attack several years ago    Hypertension 202    Low back pain     Visual impairment     I dont know but since i was little     Past Surgical History:   Procedure Laterality Date    TUBAL ABDOMINAL LIGATION   "2012     Family History   Problem Relation Age of Onset    Diabetes Mother     Migraines Mother     Stroke Mother     Arthritis Mother         Mother n father    Cancer Mother         Lung cancer    COPD Mother     Depression Mother     Hypertension Mother     Miscarriages / Stillbirths Mother         I also had a miscarriage at a very young age had a dnc done    Depression Father     Alcohol abuse Father     Arthritis Father     Hypertension Father     Depression Brother     Hypertension Brother     Diabetes Other     Learning disabilities Other     ADD / ADHD Other     Cancer Other     Allergies Other     Migraines Other     Asthma Other     Seizures Other     Stroke Paternal Grandfather     Cancer Paternal Grandfather         Lung cancer n open heart surgery due to heart attacks had a pace maker    Hypertension Paternal Grandfather     Diabetes Paternal Grandmother     Stroke Maternal Uncle     Cancer Maternal Uncle         Skin cancer    Cancer Maternal Grandmother         Skin cancer on her nose    Hypertension Maternal Grandmother     Thyroid disease Maternal Grandmother     Asthma Son     Cancer Maternal Aunt         Breast cancer    COPD Maternal Aunt     Hypertension Maternal Aunt     Depression Brother        Vital Signs  /80 (BP Location: Left arm, Patient Position: Sitting, Cuff Size: Adult)   Pulse 71   Ht 160 cm (62.99\")   Wt 81.2 kg (179 lb)   SpO2 99%   BMI 31.72 kg/m²   Estimated body mass index is 31.72 kg/m² as calculated from the following:    Height as of this encounter: 160 cm (62.99\").    Weight as of this encounter: 81.2 kg (179 lb).    BMI is >= 30 and <35. (Class 1 Obesity). The following options were offered after discussion;: exercise counseling/recommendations and nutrition counseling/recommendations    Physical Exam  Vitals reviewed.   Constitutional:       Appearance: Normal appearance.   Cardiovascular:      Rate and Rhythm: Normal rate and regular rhythm.      Heart " sounds: Normal heart sounds.   Pulmonary:      Effort: Pulmonary effort is normal.      Breath sounds: Normal breath sounds.   Musculoskeletal:      Lumbar back: Tenderness and bony tenderness present. Decreased range of motion.   Skin:     General: Skin is warm and dry.      Capillary Refill: Capillary refill takes less than 2 seconds.   Neurological:      General: No focal deficit present.      Mental Status: She is alert and oriented to person, place, and time.   Psychiatric:         Mood and Affect: Mood normal.         Behavior: Behavior normal.        Physical Exam      Results  Laboratory Studies  A1c was 5.7 in May.    Patient has been erroneously marked as diabetic. Based on the available clinical information, she does not have diabetes and should therefore be excluded from diabetic health maintenance and quality measures for the remainder of the reporting period.         Assessment and Plan     Diagnoses and all orders for this visit:    1. Sciatica associated with disorder of lumbar spine (Primary)  -     gabapentin (Neurontin) 800 MG tablet; Take 1 tablet by mouth 4 (Four) Times a Day.  Dispense: 120 tablet; Refill: 0  -     Ambulatory Referral to Neurosurgery    2. Depression, recurrent  -     FLUoxetine (PROzac) 40 MG capsule; Take 1 capsule by mouth Daily.  Dispense: 30 capsule; Refill: 1    3. Prediabetes      Assessment & Plan  1. Hot Flashes.  She reports experiencing hot flashes and inquired about treatment options. It was recommended that she consult her gynecologist for further evaluation and management, as insurance coverage for new medications like Veozah can be challenging. Hormone replacement therapy was discussed but noted to carry increased risks for blood clots, especially given her smoking status. SSRIs were also mentioned as a potential treatment option - she is currently on Prozac.    2. Depression.  She has been on Prozac 20 mg but reports it is no longer effective. She was advised  not to abruptly stop the medication due to potential withdrawal symptoms. The Prozac dosage will be increased to 40 mg daily. She can take two 20 mg capsules until her current supply runs out, then switch to the 40 mg capsules. If symptoms do not improve, alternative medications will be considered.    3. Back Pain.  She reports worsening back pain with symptoms radiating to her hips and legs. A referral to neurosurgery was made, and the last MRI from December 2023 will be included. If new imaging is required, she will be notified.    4. Prediabetes.  Her A1C has improved from 5.9 to 5.7. She is advised to continue her current lifestyle modifications, including maintaining a healthy diet and regular exercise. Documentation will be updated to reflect her prediabetic status accurately.    5. Medication Management.  Refills for gabapentin and Prozac were provided. The Prozac dosage was increased to 40 mg daily. The prescriptions will be sent to [facility] in [place] at [facility].    Follow-up  Return in 1 month for a recheck on the increased Prozac dose.        Follow Up  Return in about 4 weeks (around 11/25/2024) for Recheck on Prozac.      Patient or patient representative verbalized consent for the use of Ambient Listening during the visit with  MARIANGEL Davison for chart documentation. 10/28/2024  11:39 EDT    MARIANGEL Davison

## 2024-10-30 ENCOUNTER — TELEPHONE (OUTPATIENT)
Dept: FAMILY MEDICINE CLINIC | Facility: CLINIC | Age: 39
End: 2024-10-30
Payer: COMMERCIAL

## 2024-10-30 DIAGNOSIS — M53.86 SCIATICA ASSOCIATED WITH DISORDER OF LUMBAR SPINE: Primary | ICD-10-CM

## 2024-10-30 NOTE — TELEPHONE ENCOUNTER
Pt called back and I was unable to take the phone call and then attempted to call pt back but no awnser.    Hub to relay:  Pt needs MRI before being seen by neurosurgery. Order has been placed. Should be receiving a call for an appt.

## 2024-10-30 NOTE — TELEPHONE ENCOUNTER
ATTEMPTED BOTH NUMBERS ON FILE AND WAS UNABLE TO LEAVE VOICEMAIL'S.   SENDING MYCHART MESSAGE TO PT TO BE AWARE OF PT NEEDING MRI BEFORE SHE CAN BE SEEN BY NEUROSURGERY

## 2024-10-30 NOTE — PROGRESS NOTES
Please let patient know that she will need a new lumbar MRI before she can be seen by neurosurgery (we discussed this during her most recent appointment and she was aware that this was a possibility!). I've placed the order for the MRI already so hopefully someone will be calling her soon to schedule.

## 2024-11-15 DIAGNOSIS — M53.86 SCIATICA ASSOCIATED WITH DISORDER OF LUMBAR SPINE: ICD-10-CM

## 2024-11-15 DIAGNOSIS — M54.50 LUMBAR PAIN: ICD-10-CM

## 2024-11-15 RX ORDER — GABAPENTIN 800 MG/1
800 TABLET ORAL 4 TIMES DAILY
Qty: 120 TABLET | Refills: 0 | OUTPATIENT
Start: 2024-11-15

## 2024-11-15 RX ORDER — IBUPROFEN 800 MG/1
800 TABLET, FILM COATED ORAL EVERY 8 HOURS PRN
Qty: 90 TABLET | Refills: 1 | Status: SHIPPED | OUTPATIENT
Start: 2024-11-15

## 2024-11-15 NOTE — TELEPHONE ENCOUNTER
.    Caller: Heidi Webb    Relationship: Self    Best call back number:[phone]    Requested Prescriptions:   Requested Prescriptions     Pending Prescriptions Disp Refills    gabapentin (Neurontin) 800 MG tablet 120 tablet 0     Sig: Take 1 tablet by mouth 4 (Four) Times a Day.    ibuprofen (ADVIL,MOTRIN) 800 MG tablet 90 tablet 1     Sig: Take 1 tablet by mouth Every 8 (Eight) Hours As Needed for Moderate Pain.        Pharmacy where request should be sent: [facility] [id number] [address] AT [facility] [address] - [phone] [facility] [phone] FX     Last office visit with prescribing clinician: 10/28/2024   Last telemedicine visit with prescribing clinician: Visit date not found   Next office visit with prescribing clinician: 12/2/2024         Does the patient have less than a 3 day supply:  [x] Yes  [] No    Would you like a call back once the refill request has been completed: [] Yes [x] No    If the office needs to give you a call back, can they leave a voicemail: [] Yes [x] No    Loraine Bynum Rep   11/15/24 09:55 EST

## 2024-11-21 ENCOUNTER — TELEPHONE (OUTPATIENT)
Dept: FAMILY MEDICINE CLINIC | Facility: CLINIC | Age: 39
End: 2024-11-21
Payer: COMMERCIAL

## 2024-11-21 DIAGNOSIS — M53.86 SCIATICA ASSOCIATED WITH DISORDER OF LUMBAR SPINE: Primary | ICD-10-CM

## 2024-11-21 DIAGNOSIS — M54.50 LUMBAR PAIN: ICD-10-CM

## 2024-11-21 NOTE — TELEPHONE ENCOUNTER
ITZEL FROM [facility] CALLED ABOUT THE LUMBAR SPINE MRI.    [facility] MEDICAID (PATIENTS INSURANCE) IS REQUIRING THAT THE PATIENT HAVE A CONSULT WITH ORTHO IN REGARDS TO THIS DIAGNOSIS WITH CLINICAL DOCUMENTATION FAXED AS WELL AS AN XRAY OF THE LUMBAR SPINE, ALSO WITH REPORT/DOCUMENT FAXED TO INSURANCE. IN ADDITION TO, INSURANCE IS ALSO WANTING THE PATIENT TO COMPLETE PHYSICAL THERAPY, CHIROPRACTIC CARE OR OSTEOPATHIC MANIPULATION, OR HOME EXERCISES   FOR 6 WEEKS WITH OUT IMPROVEMENT. IF ANY THESE HAVE BEEN COMPLETED WITH IN THE LAST 6 MONTH WE CAN SEND PROPER DOCUMENTATION TO PROVE COMPLETION HOWEVER SHE WILL STILL NEED THE ORTHO CONSULT AND XRAY. [id number] IS AN OPTION BUT WITH OUT THE INSURANCES REQUIRED DOCUMENTATION (XRAY AND ORTHO CONSULT) IT IS UNLIKELY TO BE EFFECTIVE    [id number] INFORMATION   [phone]  REFERENCE # [id number]    [facility] IS SENDING REFERRAL TO SCHEDULING TO PUSH PATIENTS APPOINTMENT OUT 8-10 WEEKS FROM HER ORIGINAL APPOINTMENT (11/22/2024)TO ALLOW TIME FOR PATIENT TO COMPLETE REQUIRED THERAPY, ORTHO CONSULT, AND XRAY OF LUMBAR SPINE REGION.

## 2024-11-21 NOTE — TELEPHONE ENCOUNTER
Please call and let patient know that insurance is requiring that she have an x-ray of her lumbar spine, be seen by orthopedics, and have 6-weeks of PT before they will approve for her to have an MRI    I've placed orders for ortho, PT, and the x-ray. She can come by the office anytime between 8AM and 4PM for the x-ray (lunch is 12-1). Someone will be in touch with her to schedule for PT and Ortho.

## 2024-11-22 DIAGNOSIS — M53.86 SCIATICA ASSOCIATED WITH DISORDER OF LUMBAR SPINE: ICD-10-CM

## 2024-11-22 RX ORDER — GABAPENTIN 800 MG/1
800 TABLET ORAL 4 TIMES DAILY
Qty: 120 TABLET | Refills: 0 | Status: SHIPPED | OUTPATIENT
Start: 2024-11-22

## 2024-11-22 NOTE — TELEPHONE ENCOUNTER
Caller: Heidi Webb    Relationship: Self    Best call back number: [phone]     Requested Prescriptions:   Requested Prescriptions     Pending Prescriptions Disp Refills    gabapentin (Neurontin) 800 MG tablet 120 tablet 0     Sig: Take 1 tablet by mouth 4 (Four) Times a Day.        Pharmacy where request should be sent: [facility] [id number] [address] AT [facility] [address] - [phone]  - [phone]     Last office visit with prescribing clinician: 10/28/2024   Last telemedicine visit with prescribing clinician: Visit date not found   Next office visit with prescribing clinician: 12/2/2024     Additional details provided by patient: pt has 1 day of meds left.    Does the patient have less than a 3 day supply:  [x] Yes  [] No    Would you like a call back once the refill request has been completed: [] Yes [x] No    If the office needs to give you a call back, can they leave a voicemail: [] Yes [x] No    Loraine Greer Rep   11/22/24 13:12 EST

## 2025-01-08 ENCOUNTER — TELEPHONE (OUTPATIENT)
Dept: FAMILY MEDICINE CLINIC | Facility: CLINIC | Age: 40
End: 2025-01-08

## 2025-01-08 NOTE — TELEPHONE ENCOUNTER
"Relay     \"Left message for patient to see if she done physical therapy for her MRI requirements?\"                "

## 2025-01-22 ENCOUNTER — TELEPHONE (OUTPATIENT)
Dept: FAMILY MEDICINE CLINIC | Facility: CLINIC | Age: 40
End: 2025-01-22
Payer: COMMERCIAL

## 2025-01-22 NOTE — TELEPHONE ENCOUNTER
HUB TO RELAY  Lvm for pt to come in office and have xrays. Orders are in chart    01/23 second  left

## 2025-04-03 ENCOUNTER — TELEPHONE (OUTPATIENT)
Dept: OBSTETRICS AND GYNECOLOGY | Facility: CLINIC | Age: 40
End: 2025-04-03

## 2025-04-03 NOTE — TELEPHONE ENCOUNTER
Caller: Heidi Webb    Relationship:  Self    Best call back number: [phone] (home)       PATIENT CALLED REQUESTING TO CANCEL SAME DAY APPT.    Did the patient call AFTER the start time of their scheduled appointment?  []YES  [x]NO    Was the patient's appointment rescheduled? [x]YES  []NO